# Patient Record
Sex: MALE | Race: WHITE | URBAN - METROPOLITAN AREA
[De-identification: names, ages, dates, MRNs, and addresses within clinical notes are randomized per-mention and may not be internally consistent; named-entity substitution may affect disease eponyms.]

---

## 2017-04-17 ENCOUNTER — TRANSFERRED RECORDS (OUTPATIENT)
Dept: HEALTH INFORMATION MANAGEMENT | Facility: CLINIC | Age: 72
End: 2017-04-17

## 2017-12-18 ENCOUNTER — TRANSFERRED RECORDS (OUTPATIENT)
Dept: HEALTH INFORMATION MANAGEMENT | Facility: CLINIC | Age: 72
End: 2017-12-18

## 2018-01-08 ENCOUNTER — TRANSFERRED RECORDS (OUTPATIENT)
Dept: HEALTH INFORMATION MANAGEMENT | Facility: CLINIC | Age: 73
End: 2018-01-08

## 2018-01-24 ENCOUNTER — TRANSFERRED RECORDS (OUTPATIENT)
Dept: HEALTH INFORMATION MANAGEMENT | Facility: CLINIC | Age: 73
End: 2018-01-24

## 2018-01-27 ENCOUNTER — TRANSFERRED RECORDS (OUTPATIENT)
Dept: HEALTH INFORMATION MANAGEMENT | Facility: CLINIC | Age: 73
End: 2018-01-27

## 2018-02-08 DIAGNOSIS — M54.50 LUMBAGO: Primary | ICD-10-CM

## 2018-02-08 NOTE — TELEPHONE ENCOUNTER
APPT INFO    Date /Time: 2/13/18 9AM   Reason for Appt: Spinal Stenosis/ Lumbar Region   Ref Provider/Clinic: Dr. Thom Zayas   Are there internal records? Yes/No?  IF YES, list clinic names: no   Are there outside records? Yes/No? Yes - see below   Patient Contact (Y/N) & Call Details: No - pt is referred   Action: Faxed request to referring clinic for recs     OUTSIDE RECORDS CHECKLIST     CLINIC NAME COMMENTS REC (x) IMG (x)   [facility]  x x

## 2018-02-08 NOTE — TELEPHONE ENCOUNTER
Records Received [facility]    Date/Exam/Location  (specify location if different)   Office Notes: 1/24/18   Radiology Reports: MRI L Spine 1/8/18  XR L Spine 12/18/17   Missing: Waiting for imaging to be mailed

## 2018-02-09 NOTE — TELEPHONE ENCOUNTER
Received Imaging From: [facility]    Image Type (x): Disc:_x__  Pacs:___      Exam Date/Name: Lumbar Spine 12/18/17  MRI lumbar spine 1/6/18  Left knee 1/24/18  Right knee 1/24/18  Left hip 12/18/17  Knee 8/25/17    Reports included  Comments: put in film room  due to appt date

## 2018-02-12 ASSESSMENT — ENCOUNTER SYMPTOMS
TREMORS: 0
PARALYSIS: 0
DIZZINESS: 0
WEAKNESS: 0
DISTURBANCES IN COORDINATION: 0
NUMBNESS: 1
LOSS OF CONSCIOUSNESS: 0
MEMORY LOSS: 0
HEADACHES: 0
SPEECH CHANGE: 0
TINGLING: 1
SEIZURES: 0

## 2018-02-13 ENCOUNTER — OFFICE VISIT (OUTPATIENT)
Dept: SURGERY | Facility: CLINIC | Age: 73
End: 2018-02-13
Payer: COMMERCIAL

## 2018-02-13 ENCOUNTER — ANESTHESIA EVENT (OUTPATIENT)
Dept: SURGERY | Facility: CLINIC | Age: 73
End: 2018-02-13

## 2018-02-13 ENCOUNTER — OFFICE VISIT (OUTPATIENT)
Dept: ORTHOPEDICS | Facility: CLINIC | Age: 73
End: 2018-02-13
Payer: COMMERCIAL

## 2018-02-13 ENCOUNTER — APPOINTMENT (OUTPATIENT)
Dept: SURGERY | Facility: CLINIC | Age: 73
End: 2018-02-13
Payer: COMMERCIAL

## 2018-02-13 ENCOUNTER — RADIANT APPOINTMENT (OUTPATIENT)
Dept: GENERAL RADIOLOGY | Facility: CLINIC | Age: 73
End: 2018-02-13
Attending: ORTHOPAEDIC SURGERY
Payer: COMMERCIAL

## 2018-02-13 ENCOUNTER — ALLIED HEALTH/NURSE VISIT (OUTPATIENT)
Dept: SURGERY | Facility: CLINIC | Age: 73
End: 2018-02-13
Payer: COMMERCIAL

## 2018-02-13 ENCOUNTER — PRE VISIT (OUTPATIENT)
Dept: ORTHOPEDICS | Facility: CLINIC | Age: 73
End: 2018-02-13

## 2018-02-13 VITALS
TEMPERATURE: 97.8 F | HEART RATE: 67 BPM | HEIGHT: 70 IN | BODY MASS INDEX: 29.92 KG/M2 | OXYGEN SATURATION: 96 % | DIASTOLIC BLOOD PRESSURE: 75 MMHG | WEIGHT: 209 LBS | RESPIRATION RATE: 16 BRPM | SYSTOLIC BLOOD PRESSURE: 145 MMHG

## 2018-02-13 VITALS — WEIGHT: 209 LBS | HEIGHT: 70 IN | BODY MASS INDEX: 29.92 KG/M2

## 2018-02-13 DIAGNOSIS — Z13.228 SCREENING FOR ENDOCRINE, NUTRITIONAL, METABOLIC AND IMMUNITY DISORDER: ICD-10-CM

## 2018-02-13 DIAGNOSIS — Z13.21 SCREENING FOR ENDOCRINE, NUTRITIONAL, METABOLIC AND IMMUNITY DISORDER: ICD-10-CM

## 2018-02-13 DIAGNOSIS — Z13.29 SCREENING FOR ENDOCRINE, NUTRITIONAL, METABOLIC AND IMMUNITY DISORDER: ICD-10-CM

## 2018-02-13 DIAGNOSIS — Z01.818 PREOP EXAMINATION: Primary | ICD-10-CM

## 2018-02-13 DIAGNOSIS — M48.062 SPINAL STENOSIS OF LUMBAR REGION WITH NEUROGENIC CLAUDICATION: Primary | ICD-10-CM

## 2018-02-13 DIAGNOSIS — Z13.0 SCREENING FOR ENDOCRINE, NUTRITIONAL, METABOLIC AND IMMUNITY DISORDER: ICD-10-CM

## 2018-02-13 DIAGNOSIS — Z01.818 PREOP EXAMINATION: ICD-10-CM

## 2018-02-13 DIAGNOSIS — Z01.818 PRE-OPERATIVE EXAMINATION: ICD-10-CM

## 2018-02-13 DIAGNOSIS — M54.50 LUMBAGO: ICD-10-CM

## 2018-02-13 LAB
ALBUMIN SERPL-MCNC: 4 G/DL (ref 3.4–5)
ALP SERPL-CCNC: 78 U/L (ref 40–150)
ALT SERPL W P-5'-P-CCNC: 28 U/L (ref 0–70)
ANION GAP SERPL CALCULATED.3IONS-SCNC: 5 MMOL/L (ref 3–14)
AST SERPL W P-5'-P-CCNC: 13 U/L (ref 0–45)
BILIRUB SERPL-MCNC: 0.5 MG/DL (ref 0.2–1.3)
BUN SERPL-MCNC: 19 MG/DL (ref 7–30)
CALCIUM SERPL-MCNC: 9.1 MG/DL (ref 8.5–10.1)
CHLORIDE SERPL-SCNC: 105 MMOL/L (ref 94–109)
CO2 SERPL-SCNC: 30 MMOL/L (ref 20–32)
CREAT SERPL-MCNC: 1.03 MG/DL (ref 0.66–1.25)
ERYTHROCYTE [DISTWIDTH] IN BLOOD BY AUTOMATED COUNT: 12.8 % (ref 10–15)
GFR SERPL CREATININE-BSD FRML MDRD: 71 ML/MIN/1.7M2
GLUCOSE SERPL-MCNC: 82 MG/DL (ref 70–99)
HBA1C MFR BLD: 6.2 % (ref 4.3–6)
HCT VFR BLD AUTO: 44.4 % (ref 40–53)
HGB BLD-MCNC: 14.7 G/DL (ref 13.3–17.7)
MCH RBC QN AUTO: 29.9 PG (ref 26.5–33)
MCHC RBC AUTO-ENTMCNC: 33.1 G/DL (ref 31.5–36.5)
MCV RBC AUTO: 90 FL (ref 78–100)
PLATELET # BLD AUTO: 176 10E9/L (ref 150–450)
POTASSIUM SERPL-SCNC: 4.5 MMOL/L (ref 3.4–5.3)
PROT SERPL-MCNC: 7.9 G/DL (ref 6.8–8.8)
RBC # BLD AUTO: 4.92 10E12/L (ref 4.4–5.9)
SODIUM SERPL-SCNC: 140 MMOL/L (ref 133–144)
WBC # BLD AUTO: 5.5 10E9/L (ref 4–11)

## 2018-02-13 RX ORDER — AMLODIPINE BESYLATE AND ATORVASTATIN CALCIUM 2.5; 4 MG/1; MG/1
1 TABLET, FILM COATED ORAL DAILY
COMMUNITY
End: 2018-02-13

## 2018-02-13 RX ORDER — CIDER VINEGAR 300 MG
1000 TABLET ORAL 2 TIMES DAILY
COMMUNITY

## 2018-02-13 ASSESSMENT — LIFESTYLE VARIABLES: TOBACCO_USE: 1

## 2018-02-13 ASSESSMENT — ENCOUNTER SYMPTOMS
DYSRHYTHMIAS: 1
ORTHOPNEA: 0

## 2018-02-13 NOTE — PATIENT INSTRUCTIONS
Preparing for Your Surgery      Name:  Edwin Ellison   MRN:  [id number]   :  1945   Today's Date:  2018     Arriving for surgery:  Surgery date:  You will be called 1-2 days prior to your surgery with time, date and location of your surgery. If you do not hear please call Preoperative assessment center at [phone].     Please come to:     [facility] [address]  [address] [place]  [address]    -[employer] parking is available in front of [facility] from 5:15AM to 8:00PM. If you prefer, park your car in the Green Lot.    -Proceed to the 3rd floor, check in at the Adult Surgery Waiting Lounge. [phone]    If an escort is needed stop at the Information Desk in the lobby. Inform the information person that you are here for surgery. An escort to the Adult Surgery Waiting Lounge will be provided.        What can I eat or drink?  -  You may have solid food or milk products until 8 hours prior to your surgery.  -  You may have water, apple juice or 7up/Sprite until 2 hours prior to your surgery.    Which medicines can I take?  -  Do NOT take these medications in the morning, the day of surgery:  Please stop aspirin and fish oil one week prior to surgery.  Please do not take hydrochlorothiazide and lisinopril the morning of surgery.    -  Please take these medications the day of surgery:  Please take atenolol, ranitidine and flovent the morning of surgery.     How do I prepare myself?  -  Take two showers: one the night before surgery; and one the morning of surgery.         Use Scrubcare or Hibiclens to wash from neck down.  You may use your own shampoo and conditioner. No other hair products.   -  Do NOT use lotion, powder, deodorant, or antiperspirant the day of your surgery.  -  Do NOT wear any jewelry.  -  Begin using Incentive Spirometer 1 week prior to surgery.  Use 4 times per day, up to 5 breaths each time.  Bring Incentive Spirometer to  hospital.  -Do not bring your own medications to the hospital, except for inhalers and eye drops.  -  Bring your ID and insurance card.    Questions or Concerns:  If you have questions or concerns, please call the  Preoperative Assessment Center, Monday-Friday 7AM-7PM:  [phone]          AFTER YOUR SURGERY  Breathing exercises   Breathing exercises help you recover faster. Take deep breaths and let the air out slowly. This will:     Help you wake up after surgery.    Help prevent complications like pneumonia.  Preventing complications will help you go home sooner.   We may give you a breathing device (incentive spirometer) to encourage you to breathe deeply.   Nausea and vomiting   You may feel sick to your stomach after surgery; if so, let your nurse know.    Pain control:  After surgery, you may have pain. Our goal is to help you manage your pain. Pain medicine will help you feel comfortable enough to do activities that will help you heal.  These activities may include breathing exercises, walking and physical therapy.   To help your health care team treat your pain we will ask: 1) If you have pain  2) where it is located 3) describe your pain in your words  Methods of pain control include medications given by mouth, vein or by nerve block for some surgeries.  We may give you a pain control pump that will:  1) Deliver the medicine through a tube placed in your vein  2) Control the amount of medicine you receive  3) Allow you to push a button to deliver a dose of pain medicine  Sequential Compression Device (SCD) or Pneumo Boots:  You may need to wear SCD S on your legs or feet. These are wraps connected to a machine that pumps in air and releases it. The repeated pumping helps prevent blood clots from forming.   Using an Incentive Spirometer    An incentive spirometer is a device that helps you do deep breathing exercises. These exercises expand your lungs, aid in circulation, and help prevent pneumonia. Deep  breathing exercises also help you breathe better and improve the function of your lungs by:    Keeping your lungs clear    Strengthening your breathing muscles    Helping prevent respiratory complications or problems  The incentive spirometer gives you a way to take an active part in your care. A nurse or therapist will teach you breathing exercises. To do these exercises, you will breathe in through your mouth and not your nose. The incentive spirometer only works correctly if you breathe in through your mouth.    Steps to clear lungs  Step 1. Exhale normally. Then, inhale normally.    Relax and breathe out.  Step 2. Place your lips tightly around the mouthpiece.    Make sure the device is upright and not tilted.  Step 3. Inhale as much air as you can through the mouthpiece (don't breath through your nose).    Inhale slowly and deeply.    Hold your breath long enough to keep the balls or disk raised for at least 3 to 5 seconds, or as instructed by your healthcare provider.  Step 4. Repeat the exercise regularly.    Begin using the Incentive Spirometer one week prior to your surgery, 4 times per day-5 times each.

## 2018-02-13 NOTE — MR AVS SNAPSHOT
After Visit Summary   2/13/2018    Edwin Ellison    MRN: [id number]           Patient Information     Date Of Birth          1945        Visit Information        Provider Department      2/13/2018 9:00 AM Leo James MD [facility]        Today's Diagnoses     Spinal stenosis of lumbar region with neurogenic claudication    -  1    Screening for endocrine, nutritional, metabolic and immunity disorder        Pre-operative examination           Follow-ups after your visit        Additional Services     PAC Visit Referral (For [facility] Only)       Does this visit require an Anesthesia consult?  L3-5 Decompression    H&P done by:  N/A and Other (Specify): PAC      Please be aware that coverage of these services is subject to the terms and limitations of your health insurance plan.  Call member services at your health plan with any benefit or coverage questions.      Please bring the following to your appointment:  >>   Any x-rays, CTs or MRIs which have been performed.  Contact the facility where they were done to arrange for  prior to your scheduled appointment.  Any new CT, MRI or other procedures ordered by your specialist must be performed at a [facility] facility or coordinated by your clinic's referral office.    >>   List of current medications  >>   This referral request   >>   Any documents/labs given to you for this referral                  Your next 10 appointments already scheduled     Feb 13, 2018  1:10 PM CST   (Arrive by 12:55 PM)   PAC Anesthesia Consult with  Pac Anesthesiologist   [facility] ([facility])    [address] [place] [address]  [address] [facility] [address] [phone]            Feb 13, 2018  1:30 PM CST   LAB with  LAB   [facility] ([facility])    [address] [place] [address]  [address] [facility] [address] [phone]           Please do not eat 10-12  hours before your appointment if you are coming in fasting for labs on lipids, cholesterol, or glucose (sugar). This does not apply to pregnant women. Water, hot tea and black coffee (with nothing added) are okay. Do not drink other fluids, diet soda or chew gum.              Future tests that were ordered for you today     Open Future Orders        Priority Expected Expires Ordered    Vitamin D Deficiency Routine 2/13/2018 2/13/2019 2/13/2018    Albumin level Routine 2/13/2018 2/13/2019 2/13/2018    ABO/Rh type and screen Routine 2/13/2018 3/15/2018 2/13/2018    CBC with platelets Routine 2/13/2018 3/15/2018 2/13/2018    Comprehensive metabolic panel Routine 2/13/2018 3/15/2018 2/13/2018    Hemoglobin A1c Routine  2/14/2019 2/13/2018            Who to contact     Please call your clinic at [phone] to:    Ask questions about your health    Make or cancel appointments    Discuss your medicines    Learn about your test results    Speak to your doctor            Additional Information About Your Visit        [employer] Information     [employer] gives you secure access to your electronic health record. If you see a primary care provider, you can also send messages to your care team and make appointments. If you have questions, please call your primary care clinic.  If you do not have a primary care provider, please call [phone] and they will assist you.      [employer] is an electronic gateway that provides easy, online access to your medical records. With [employer], you can request a clinic appointment, read your test results, renew a prescription or communicate with your care team.     To access your existing account, please contact your [facility] or call [phone] for assistance.        Care EveryWhere ID     This is your Care EveryWhere ID. This could be used by other organizations to access your [facility] medical records  [id number]        Your Vitals Were     Height BMI (Body  "Mass Index)                1.778 m (5' 10\") 29.99 kg/m2           Blood Pressure from Last 3 Encounters:   02/13/18 145/75    Weight from Last 3 Encounters:   02/13/18 94.8 kg (209 lb)   02/13/18 94.8 kg (209 lb)              We Performed the Following     PAC Visit Referral (For [facility] Only)     [facility]-Operative Worksheet          Today's Medication Changes          These changes are accurate as of 2/13/18 12:53 PM.  If you have any questions, ask your nurse or doctor.               Start taking these medicines.        Dose/Directions    ranitidine 150 MG tablet   Commonly known as:  ZANTAC   Started by:  3,  Pac Baltazar        Dose:  150 mg   Take 1 tablet (150 mg) by mouth daily   Quantity:  60 tablet   Refills:  0                Primary Care Provider Fax #    [facility] [phone]       [address] [facility] [address]        Equal Access to Services     PEYTON VILLASENOR : Vladimir Christianson, byron petit, tena adame, celine jorge . So [facility] [phone].    ATENCIÓN: Si habla español, tiene a ma disposición servicios gratuitos de asistencia lingüística. Llame al [phone].    We comply with applicable federal civil rights laws and [place] laws. We do not discriminate on the basis of race, color, national origin, age, disability, sex, sexual orientation, or gender identity.            Thank you!     Thank you for choosing [facility]  for your care. Our goal is always to provide you with excellent care. Hearing back from our patients is one way we can continue to improve our services. Please take a few minutes to complete the written survey that you may receive in the mail after your visit with us. Thank you!             Your Updated Medication List - Protect others around you: Learn how to safely use, store and throw away your medicines at www.disposemymeds.org.          This list is accurate as of 2/13/18 12:53 PM.  " Always use your most recent med list.                   Brand Name Dispense Instructions for use Diagnosis    ASPIRIN PO      Take 325 mg by mouth At Bedtime        ATENOLOL PO      Take 12.5 mg by mouth every morning        ATORVASTATIN CALCIUM PO      Take 40 mg by mouth At Bedtime        Fish Oil 1000 MG Cpdr      Take 1,000 mg by mouth 2 times daily        FLUTICASONE FUROATE NA      [place] in nostril 2 times daily        HYDROCHLOROTHIAZIDE PO      Take 25 mg by mouth every morning        LISINOPRIL PO      Take 20 mg by mouth every morning        ranitidine 150 MG tablet    ZANTAC    60 tablet    Take 1 tablet (150 mg) by mouth daily

## 2018-02-13 NOTE — LETTER
2/13/2018       RE: Edwin Ellison  [address]  [place] [facility] [address]     Dear Colleague,    Thank you for referring your patient, Edwin Ellison, to the [facility] at [facility]. Please see a copy of my visit note below.    Spine Surgery Return Clinic Visit      Chief Complaint:   Back Pain (pt states he is having numbness and tingling in his right leg, some days his back pain comes and goes and the right ankle has shooting pain and then can't walk feels like rocks on the bottom of the foot.)      Interval HPI:  Symptom Profile Including: location of symptoms, onset, severity, exacerbating/alleviating factors, previous treatments:        Edwin Ellison is a 72 year old male who presents for evaluation of lumbar stenosis with neurogenic claudication and right greater than left claudicatory leg pain.  The pain starts in his right buttock and radiates down the lateral aspect of the leg into the foot in an L5 distribution.  He has noticed progressive numbness over the last several months.  He has had back and leg problems for years but it was not until a few months ago that he developed this numbness problem.  He is also noticed that the right foot and right great toe are getting weaker than they were in the past.  He is done multiple rounds of lumbar physical therapy with core strengthening exercises.  He is also been taking Tylenol for pain relief.  He has wisely avoided narcotics.  He has had multiple steroid injections for his knees and none of them were very effective so he has not wanted to pursue lumbar epidural steroid injections.            Past Medical History:     Past Medical History:   Diagnosis Date     Arthritis      Cardiac abnormality      Hearing problem      Heart disease      Hyperlipidemia      Hypertension      Reduced vision             Past Surgical History:     Past Surgical History:   Procedure Laterality Date     C CARDIAC SURG PROCEDURE UNLIST    "    C HAND/FINGER SURGERY UNLISTED       C PELVIS/HIP JOINT SURGERY UNLISTED       KNEE SURGERY              Social History:     Social History   Substance Use Topics     Smoking status: Former Smoker     Packs/day: 1.50     Years: 20.00     Types: Cigarettes, Cigars, Pipe, Dip, chew, snus or snuff     Start date: 1/1/1965     Quit date: 1/1/1985     Smokeless tobacco: Former User     Quit date: 1/1/1985     Alcohol use Yes            Family History:   No family history on file.         Allergies:   Not on File         Medications:     No current outpatient prescriptions on file.     No current facility-administered medications for this visit.              Review of Systems:   A 10 point musculoskeletal and neurologic ROS was performed with pertinent positives and negatives noted in the HPI.  Additional systems were also reviewed and are documented at the bottom of the note.         Physical Exam:   Vitals: Ht 1.778 m (5' 10\")  Wt 94.8 kg (209 lb)  BMI 29.99 kg/m2  Constitutional: awake, alert, cooperative, no apparent distress, appears stated age.    Eyes: The sclera are white.  Ears, Nose, Throat: The trachea is midline.  Psychiatric: The patient has a normal affect.  Respiratory: breathing non-labored  Cardiovascular: The extremities are warm and perfused.  Skin: no obvious rashes or lesions.  Musculoskeletal, Neurologic, and Spine:   The bilateral lower extremities are examined.  He has 5 out of 5 strength in hip flexion, knee extension, knee flexion, tibialis anterior.  2 out of 5 right extensor hallux longus on the right side and 4 out of 5 on the left side.  Positive right straight leg raise.  He has crepitus in the right knee with range of motion.  Diminished left patellar reflex secondary to his previous knee replacement.  2+ on the right.  +1 Achilles reflexes bilaterally.  No clonus.  I cannot elicit a Babinski sign.  He has diminished sensation right L5 distribution.  Otherwise intact L3-S1.         " Imaging:   We ordered and independently reviewed new radiographs at this clinic visit. The results were discussed with the patient. Findings include:     January 6, 2018 lumbar MRI: Severe lateral recess stenosis at L3-4.  Severe central and lateral recess stenosis L4-5 with an annular tear.  Moderate left greater than right foraminal stenosis at L4-5.  Moderate spondylosis at L5-S1 without obvious neurologic compression.  Diffuse spondylosis at other levels without obvious neurologic compression.    December 18, 2017 lumbar radiographs: Diffuse lumbar spondylosis with no obvious instability or fractures.  Preserved overall alignment.    New flexion-extension lumbar radiographs were ordered and reviewed today.       Assessment and Plan:     72 year old male with severe lumbar spondylosis and neurogenic claudication.    At this point options would be to continue conservative care with injections, physical therapy and oral medications.    Alternatively, if he wished to proceed with surgery I would recommend an L3-5 decompression.  He does have some foraminal stenosis at the L4-5 level but I do not think it is too severe and I think we could probably address this by undercutting the facet joints.  He has minimal back pain and no instability so I think we could try to avoid doing a fusion surgery at this time.    Risks of this surgery include risk of infection, risk of dural tear resulting in CSF leak which might result in headaches, or possible need for lumbar drain, or possible revision surgery in the setting of a persistent leak. Risk of seroma or hematoma requiring revision surgery. Possible nerve root injury resulting in numbness weakness or paralysis into the leg. Possible radiculitis which could result in similar symptoms or could result in significant neurogenic type pain into the leg. Risk of incomplete decompression which might require revision surgery in the future.  Risk of pars fracture or postoperative  instability requiring conversion to a fusion in the future. Risk of adjacent segment problems requiring surgery in the future. Risk of incomplete relief of symptoms possibly requiring revision surgery in the future. There is a risk of blood clots in the legs or the lungs.  Furthermore, although rare, there are risks of major vessel or major organ injury from the surgery, and risks of the anesthetic including stroke heart attack and death.    He would like to proceed.  I will have him see the anesthesia clinic for medical clearance.  We will get him scheduled at his convenience.    Respectfully,  Leo James MD  Spine Surgery  [facility]

## 2018-02-13 NOTE — MR AVS SNAPSHOT
After Visit Summary   2018    Edwin Ellison    MRN: [id number]           Patient Information     Date Of Birth          1945        Visit Information        Provider Department      2018 12:30 PM Rn, [facility]        Care Instructions    Preparing for Your Surgery      Name:  Edwin Ellison   MRN:  [id number]   :  1945   Today's Date:  2018     Arriving for surgery:  Surgery date:  You will be called 1-2 days prior to your surgery with time, date and location of your surgery. If you do not hear please call Preoperative assessment center at [phone].     Please come to:     [facility] [address]  [address] [place]  [address]    - parking is available in front of [facility] from 5:15AM to 8:00PM. If you prefer, park your car in the Green Lot.    -Proceed to the 3rd floor, check in at the Adult Surgery Waiting Lounge. [phone]    If an escort is needed stop at the Information Desk in the lobby. Inform the information person that you are here for surgery. An escort to the Adult Surgery Waiting Lounge will be provided.        What can I eat or drink?  -  You may have solid food or milk products until 8 hours prior to your surgery.  -  You may have water, apple juice or 7up/Sprite until 2 hours prior to your surgery.    Which medicines can I take?  -  Do NOT take these medications in the morning, the day of surgery:  Please stop aspirin and fish oil one week prior to surgery.  Please do not take hydrochlorothiazide and lisinopril the morning of surgery.    -  Please take these medications the day of surgery:  Please take atenolol, ranitidine and flovent the morning of surgery.     How do I prepare myself?  -  Take two showers: one the night before surgery; and one the morning of surgery.         Use Scrubcare or Hibiclens to wash from neck down.  You may use your own shampoo and  conditioner. No other hair products.   -  Do NOT use lotion, powder, deodorant, or antiperspirant the day of your surgery.  -  Do NOT wear any jewelry.  -  Begin using Incentive Spirometer 1 week prior to surgery.  Use 4 times per day, up to 5 breaths each time.  Bring Incentive Spirometer to hospital.  -Do not bring your own medications to the hospital, except for inhalers and eye drops.  -  Bring your ID and insurance card.    Questions or Concerns:  If you have questions or concerns, please call the  Preoperative Assessment Center, Monday-Friday 7AM-7PM:  [phone]          AFTER YOUR SURGERY  Breathing exercises   Breathing exercises help you recover faster. Take deep breaths and let the air out slowly. This will:     Help you wake up after surgery.    Help prevent complications like pneumonia.  Preventing complications will help you go home sooner.   We may give you a breathing device (incentive spirometer) to encourage you to breathe deeply.   Nausea and vomiting   You may feel sick to your stomach after surgery; if so, let your nurse know.    Pain control:  After surgery, you may have pain. Our goal is to help you manage your pain. Pain medicine will help you feel comfortable enough to do activities that will help you heal.  These activities may include breathing exercises, walking and physical therapy.   To help your health care team treat your pain we will ask: 1) If you have pain  2) where it is located 3) describe your pain in your words  Methods of pain control include medications given by mouth, vein or by nerve block for some surgeries.  We may give you a pain control pump that will:  1) Deliver the medicine through a tube placed in your vein  2) Control the amount of medicine you receive  3) Allow you to push a button to deliver a dose of pain medicine  Sequential Compression Device (SCD) or Pneumo Boots:  You may need to wear SCD S on your legs or feet. These are wraps connected to a machine that  pumps in air and releases it. The repeated pumping helps prevent blood clots from forming.   Using an Incentive Spirometer    An incentive spirometer is a device that helps you do deep breathing exercises. These exercises expand your lungs, aid in circulation, and help prevent pneumonia. Deep breathing exercises also help you breathe better and improve the function of your lungs by:    Keeping your lungs clear    Strengthening your breathing muscles    Helping prevent respiratory complications or problems  The incentive spirometer gives you a way to take an active part in your care. A nurse or therapist will teach you breathing exercises. To do these exercises, you will breathe in through your mouth and not your nose. The incentive spirometer only works correctly if you breathe in through your mouth.    Steps to clear lungs  Step 1. Exhale normally. Then, inhale normally.    Relax and breathe out.  Step 2. Place your lips tightly around the mouthpiece.    Make sure the device is upright and not tilted.  Step 3. Inhale as much air as you can through the mouthpiece (don't breath through your nose).    Inhale slowly and deeply.    Hold your breath long enough to keep the balls or disk raised for at least 3 to 5 seconds, or as instructed by your healthcare provider.  Step 4. Repeat the exercise regularly.    Begin using the Incentive Spirometer one week prior to your surgery, 4 times per day-5 times each.          Follow-ups after your visit        Your next 10 appointments already scheduled     Feb 13, 2018  1:10 PM CST   (Arrive by 12:55 PM)   PAC Anesthesia Consult with  Pac Anesthesiologist   [facility] Pre[facility] ([facility])    [address] [place] [address]  [facility] [address] [phone]            Feb 13, 2018  1:30 PM CST   LAB with  LAB   [facility] Lab ([facility])    [address] [place] [address]  [facility] [facility] [id number]    [phone]           Please do not eat 10-12 hours before your appointment if you are coming in fasting for labs on lipids, cholesterol, or glucose (sugar). This does not apply to pregnant women. Water, hot tea and black coffee (with nothing added) are okay. Do not drink other fluids, diet soda or chew gum.              Future tests that were ordered for you today     Open Future Orders        Priority Expected Expires Ordered    Vitamin D Deficiency Routine 2/13/2018 2/13/2019 2/13/2018    Albumin level Routine 2/13/2018 2/13/2019 2/13/2018    ABO/Rh type and screen Routine 2/13/2018 3/15/2018 2/13/2018    CBC with platelets Routine 2/13/2018 3/15/2018 2/13/2018    Comprehensive metabolic panel Routine 2/13/2018 3/15/2018 2/13/2018    Hemoglobin A1c Routine  2/14/2019 2/13/2018            Who to contact     Please call your clinic at [phone] to:    Ask questions about your health    Make or cancel appointments    Discuss your medicines    Learn about your test results    Speak to your doctor            Additional Information About Your Visit        [employer] Information     [employer] gives you secure access to your electronic health record. If you see a primary care provider, you can also send messages to your care team and make appointments. If you have questions, please call your primary care clinic.  If you do not have a primary care provider, please call [phone] and they will assist you.      [employer] is an electronic gateway that provides easy, online access to your medical records. With [employer], you can request a clinic appointment, read your test results, renew a prescription or communicate with your care team.     To access your existing account, please contact your [facility] or call [phone] for assistance.        Care EveryWhere ID     This is your Care EveryWhere ID. This could be used by other organizations to access your [facility]  records  [id number]         Blood Pressure from Last 3 Encounters:   02/13/18 145/75    Weight from Last 3 Encounters:   02/13/18 94.8 kg (209 lb)   02/13/18 94.8 kg (209 lb)              Today, you had the following     No orders found for display         Today's Medication Changes          These changes are accurate as of 2/13/18  1:02 PM.  If you have any questions, ask your nurse or doctor.               Start taking these medicines.        Dose/Directions    ranitidine 150 MG tablet   Commonly known as:  ZANTAC   Started by:  3,  Pac Baltazar        Dose:  150 mg   Take 1 tablet (150 mg) by mouth daily   Quantity:  60 tablet   Refills:  0                Primary Care Provider Fax #    [facility] [phone]       [address] [facility] [address]        Equal Access to Services     PEYTON VILLASENOR : Vladimir Christianson, byron petit, tena adame, celine medrano. So [facility] [phone].    ATENCIÓN: Si habla español, tiene a ma disposición servicios gratuitos de asistencia lingüística. Llame al [phone].    We comply with applicable federal civil rights laws and [place] laws. We do not discriminate on the basis of race, color, national origin, age, disability, sex, sexual orientation, or gender identity.            Thank you!     Thank you for choosing [facility]  for your care. Our goal is always to provide you with excellent care. Hearing back from our patients is one way we can continue to improve our services. Please take a few minutes to complete the written survey that you may receive in the mail after your visit with us. Thank you!             Your Updated Medication List - Protect others around you: Learn how to safely use, store and throw away your medicines at www.disposemymeds.org.          This list is accurate as of 2/13/18  1:02 PM.  Always use your most recent med list.                    Brand Name Dispense Instructions for use Diagnosis    ASPIRIN PO      Take 325 mg by mouth At Bedtime        ATENOLOL PO      Take 12.5 mg by mouth every morning        ATORVASTATIN CALCIUM PO      Take 40 mg by mouth At Bedtime        Fish Oil 1000 MG Cpdr      Take 1,000 mg by mouth 2 times daily        FLUTICASONE FUROATE NA      [place] in nostril 2 times daily        HYDROCHLOROTHIAZIDE PO      Take 25 mg by mouth every morning        LISINOPRIL PO      Take 20 mg by mouth every morning        ranitidine 150 MG tablet    ZANTAC    60 tablet    Take 1 tablet (150 mg) by mouth daily

## 2018-02-13 NOTE — NURSING NOTE
Teaching Flowsheet   Relevant Diagnosis: lumbar stenosis  Teaching Topic: pre-op teaching     Person(s) involved in teaching:   Patient     Motivation Level:  Asks Questions: Yes  Eager to Learn: Yes  Cooperative: Yes  Receptive (willing/able to accept information): Yes  Any cultural factors/Roman Catholic beliefs that may influence understanding or compliance? No       Patient demonstrates understanding of the following:  Reason for the appointment, diagnosis and treatment plan: Yes  Knowledge of proper use of medications and conditions for which they are ordered (with special attention to potential side effects or drug interactions): Yes  Which situations necessitate calling provider and whom to contact: Yes     Teaching Concerns Addressed:   Comments: Pt to see PAC clinic today for H&P, MRSA nasal swab done and sent to lab, pt will have pre-op spine blood work (albumin, hgba1c, vit D) drawn with PAC labs on way out today and is aware.      Proper use and care of medication (medical equip, care aids, etc.): Yes  Nutritional needs and diet plan: Yes  Pain management techniques: Yes  Wound Care: Yes  How and/when to access community resources: Yes     Instructional Materials Used/Given: pre-op teach packet     Time spent with patient: 15 minutes.

## 2018-02-13 NOTE — NURSING NOTE
"Reason For Visit:   Chief Complaint   Patient presents with     Back Pain     pt states he is having numbness and tingling in his right leg, some days his back pain comes and goes and the right ankle has shooting pain and then can't walk feels like rocks on the bottom of the foot.       Primary MD: [facility]  Ref. MD:     ?  No  Occupation :None.  Currently working? No.  Work status?  Retired.  Smoker: No  Request smoking cessation information: No    Ht 1.778 m (5' 10\")  Wt 94.8 kg (209 lb)  BMI 29.99 kg/m2    Pain Assessment  Patient Currently in Pain: Yes  Primary Pain Location: Leg  Pain Orientation: Right  Pain Descriptors: Numbness, Tingling    Oswestry (CHAS) Questionnaire    OSWESTRY DISABILITY INDEX 2/12/2018   Count 9   Sum 14   Oswestry Score (%) 31.11          Visual Analog Pain Scale  Back Pain Scale 0-10: 0  Right leg pain: 6 (numbness and tingling)    Promis 10 Assessment    PROMIS 10 2/12/2018   In general, would you say your health is: Good   In general, would you say your quality of life is: Good   In general, how would you rate your physical health? Fair   In general, how would you rate your mental health, including your mood and your ability to think? Good   In general, how would you rate your satisfaction with your social activities and relationships? Good   In general, please rate how well you carry out your usual social activities and roles Good   To what extent are you able to carry out your everyday physical activities such as walking, climbing stairs, carrying groceries, or moving a chair? Moderately   How often have you been bothered by emotional problems such as feeling anxious, depressed or irritable? Rarely   How would you rate your fatigue on average? None   How would you rate your pain on average?   0 = No Pain  to  10 = Worst Imaginable Pain 2   In general, would you say your health is: 3   In general, would you say your quality of life is: 3 "   In general, how would you rate your physical health? 2   In general, how would you rate your mental health, including your mood and your ability to think? 3   In general, how would you rate your satisfaction with your social activities and relationships? 3   In general, please rate how well you carry out your usual social activities and roles. (This includes activities at home, at work and in your community, and responsibilities as a parent, child, spouse, employee, friend, etc.) 3   To what extent are you able to carry out your everyday physical activities such as walking, climbing stairs, carrying groceries, or moving a chair? 3   In the past 7 days, how often have you been bothered by emotional problems such as feeling anxious, depressed, or irritable? 2   In the past 7 days, how would you rate your fatigue on average? 1   In the past 7 days, how would you rate your pain on average, where 0 means no pain, and 10 means worst imaginable pain? 2   Global Mental Health Score 13   Global Physical Health Score 14   PROMIS TOTAL - SUBSCORES 27   Some recent data might be hidden                Teo Mederos CMA

## 2018-02-13 NOTE — ANESTHESIA PREPROCEDURE EVALUATION
Anesthesia Evaluation     . Pt has had prior anesthetic. Type: General    No history of anesthetic complications          ROS/MED HX    ENT/Pulmonary:     (+)SHERON risk factors snores loudly, hypertension, tobacco use, Past use , . .   (-) recent URI   Neurologic:     (+)neuropathy - Numbness in the right foot.  ,     Cardiovascular:     (+) Dyslipidemia, hypertension-range: 120/70s at home, -CAD, --stent,. Taking blood thinners Pt has received instructions: Instructions Given to patient: Patient instructed to hold ASA in preparation for surgery.  . . . :. dysrhythmias PVCs, Irregular Heartbeat/Palpitations, .      (-) angina, past MI, JIN, orthopnea/PND, angina, past MI and CABG   METS/Exercise Tolerance: Comment: Activity is limited by lumbar stenosis with neurogenic claudication in the right foot.   3 - Able to walk 1-2 blocks without stopping   Hematologic:     (+) History of Transfusion no previous transfusion reaction -      Musculoskeletal:   (+) , , other musculoskeletal- Chronic back pain.        GI/Hepatic:     (+) GERD Asymptomatic on medication,       Renal/Genitourinary:  - ROS Renal section negative       Endo:  - neg endo ROS       Psychiatric:  - neg psychiatric ROS       Infectious Disease:  - neg infectious disease ROS       Malignancy:      - no malignancy   Other:    (+) H/O Chronic Pain,                   Physical Exam      Airway   Mallampati: I  TM distance: >3 FB  Neck ROM: full    Dental   (+) caps and missing    Cardiovascular   Rhythm and rate: regular and normal  (+) murmur   (-) carotid bruit is not present and no peripheral edema    Pulmonary    breath sounds clear to auscultation    Other findings: 2/13/18: WBC:  5.5; Hgb: 14.7; Hct: 44.4; Plt: 176  2/13/18: Na: 140; K: 4.5; Cl: 105; Glu: 82; BUN: 19; Cr: 1.03; Ca: 9.1; Bili total: 0.5; Albumin: 4; Alk phos: 78; ALT: 28; AST: 13; HgbA1c 6.2    2/13/18 EKG: Sinus bradycardia with right bundle branch block, no acute changes noted.            PAC Discussion and Assessment    ASA Classification: 2  Case is suitable for: [facility]  Anesthetic techniques and relevant risks discussed: GA  Invasive monitoring and risk discussed: No  Types:   Possibility and Risk of blood transfusion discussed: Yes  NPO instructions given:   Additional anesthetic preparation and risks discussed:   Needs early admission to pre-op area:   Other:     PAC Resident/NP Anesthesia Assessment:  Edwin Ellison is a 72 year old male scheduled to undergo Lumbar 3-5 Decompression on an undetermined date with Dr. Leo James.    He has the following specific operative considerations:   1.  Increased risk of obstructive sleep apnea: Recommend careful positioning to prevent airway compromise and close monitoring of the patient's respiratory status.    2.  GERD: Patient instructed to take Tagamet as prescribed.  Consider use of RSI techniques with advanced airway maneuvers.  3.  HTN: Patient instructed to continue Atenolol as prescribed, but hold Lisinopril and HCTZ the AM of surgery.  4.  Chronic pain: Recommend careful positioning throughout the perioperative period to prevent injury or exacerbation of pain.    5.  Type & screen, CBC, BMP, EKG today.    Revised Cardiac Risk Index: 0.9% risk of major adverse cardiac event.  VTE risk: 1.8%  SHERON risk: Intermediate.  PONV risk score= 2.  (If > 2, anti-emetic intervention is recommended.)  Anesthesia considerations: Refer to PAC assessment in the anesthesia records.    Reviewed and Signed by PAC Mid-Level Provider/Resident  Mid-Level Provider/Resident: Eugenie Lawrence CNP  Date: 2/13/18  Time:     Attending Anesthesiologist Anesthesia Assessment:  72 year old for L3-5 decompression, fusion, etc in management of back pain. Patient has known CAD with hx of PCI X1 2010. Has done well since then. No significant pulmonary disease. Stable HTN, HLD, GERD.   Patient/case discussed with NICHO. No need to see patient. Patient is appropriate for the  planned procedure without further work-up or medical management.      Reviewed and Signed by PAC Anesthesiologist  Anesthesiologist: [email or web address]  Date: 2/13/2018  Time:   Pass/Fail: Pass  Disposition:     PAC Pharmacist Assessment:        Pharmacist:   Date:   Time:                           .
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
## 2018-02-13 NOTE — PROGRESS NOTES
Spine Surgery Return Clinic Visit      Chief Complaint:   Back Pain (pt states he is having numbness and tingling in his right leg, some days his back pain comes and goes and the right ankle has shooting pain and then can't walk feels like rocks on the bottom of the foot.)      Interval HPI:  Symptom Profile Including: location of symptoms, onset, severity, exacerbating/alleviating factors, previous treatments:        Edwin Ellison is a 72 year old male who presents for evaluation of lumbar stenosis with neurogenic claudication and right greater than left claudicatory leg pain.  The pain starts in his right buttock and radiates down the lateral aspect of the leg into the foot in an L5 distribution.  He has noticed progressive numbness over the last several months.  He has had back and leg problems for years but it was not until a few months ago that he developed this numbness problem.  He is also noticed that the right foot and right great toe are getting weaker than they were in the past.  He is done multiple rounds of lumbar physical therapy with core strengthening exercises.  He is also been taking Tylenol for pain relief.  He has wisely avoided narcotics.  He has had multiple steroid injections for his knees and none of them were very effective so he has not wanted to pursue lumbar epidural steroid injections.            Past Medical History:     Past Medical History:   Diagnosis Date     Arthritis      Cardiac abnormality      Hearing problem      Heart disease      Hyperlipidemia      Hypertension      Reduced vision             Past Surgical History:     Past Surgical History:   Procedure Laterality Date     C CARDIAC SURG PROCEDURE UNLIST       C HAND/FINGER SURGERY UNLISTED       C PELVIS/HIP JOINT SURGERY UNLISTED       KNEE SURGERY              Social History:     Social History   Substance Use Topics     Smoking status: Former Smoker     Packs/day: 1.50     Years: 20.00     Types: Cigarettes, Cigars, Pipe,  "Dip, chew, snus or snuff     Start date: 1/1/1965     Quit date: 1/1/1985     Smokeless tobacco: Former User     Quit date: 1/1/1985     Alcohol use Yes            Family History:   No family history on file.         Allergies:   Not on File         Medications:     No current outpatient prescriptions on file.     No current facility-administered medications for this visit.              Review of Systems:   A 10 point musculoskeletal and neurologic ROS was performed with pertinent positives and negatives noted in the HPI.  Additional systems were also reviewed and are documented at the bottom of the note.         Physical Exam:   Vitals: Ht 1.778 m (5' 10\")  Wt 94.8 kg (209 lb)  BMI 29.99 kg/m2  Constitutional: awake, alert, cooperative, no apparent distress, appears stated age.    Eyes: The sclera are white.  Ears, Nose, Throat: The trachea is midline.  Psychiatric: The patient has a normal affect.  Respiratory: breathing non-labored  Cardiovascular: The extremities are warm and perfused.  Skin: no obvious rashes or lesions.  Musculoskeletal, Neurologic, and Spine:   The bilateral lower extremities are examined.  He has 5 out of 5 strength in hip flexion, knee extension, knee flexion, tibialis anterior.  2 out of 5 right extensor hallux longus on the right side and 4 out of 5 on the left side.  Positive right straight leg raise.  He has crepitus in the right knee with range of motion.  Diminished left patellar reflex secondary to his previous knee replacement.  2+ on the right.  +1 Achilles reflexes bilaterally.  No clonus.  I cannot elicit a Babinski sign.  He has diminished sensation right L5 distribution.  Otherwise intact L3-S1.         Imaging:   We ordered and independently reviewed new radiographs at this clinic visit. The results were discussed with the patient. Findings include:     January 6, 2018 lumbar MRI: Severe lateral recess stenosis at L3-4.  Severe central and lateral recess stenosis L4-5 with " an annular tear.  Moderate left greater than right foraminal stenosis at L4-5.  Moderate spondylosis at L5-S1 without obvious neurologic compression.  Diffuse spondylosis at other levels without obvious neurologic compression.    December 18, 2017 lumbar radiographs: Diffuse lumbar spondylosis with no obvious instability or fractures.  Preserved overall alignment.    New flexion-extension lumbar radiographs were ordered and reviewed today.       Assessment and Plan:     72 year old male with severe lumbar spondylosis and neurogenic claudication.    At this point options would be to continue conservative care with injections, physical therapy and oral medications.    Alternatively, if he wished to proceed with surgery I would recommend an L3-5 decompression.  He does have some foraminal stenosis at the L4-5 level but I do not think it is too severe and I think we could probably address this by undercutting the facet joints.  He has minimal back pain and no instability so I think we could try to avoid doing a fusion surgery at this time.    Risks of this surgery include risk of infection, risk of dural tear resulting in CSF leak which might result in headaches, or possible need for lumbar drain, or possible revision surgery in the setting of a persistent leak. Risk of seroma or hematoma requiring revision surgery. Possible nerve root injury resulting in numbness weakness or paralysis into the leg. Possible radiculitis which could result in similar symptoms or could result in significant neurogenic type pain into the leg. Risk of incomplete decompression which might require revision surgery in the future.  Risk of pars fracture or postoperative instability requiring conversion to a fusion in the future. Risk of adjacent segment problems requiring surgery in the future. Risk of incomplete relief of symptoms possibly requiring revision surgery in the future. There is a risk of blood clots in the legs or the lungs.   Furthermore, although rare, there are risks of major vessel or major organ injury from the surgery, and risks of the anesthetic including stroke heart attack and death.    He would like to proceed.  I will have him see the anesthesia clinic for medical clearance.  We will get him scheduled at his convenience.    Respectfully,  Leo James MD  Spine Surgery  [facility]    Answers for HPI/ROS submitted by the patient on 2/12/2018   General Symptoms: No  Skin Symptoms: No  HENT Symptoms: No  EYE SYMPTOMS: No  HEART SYMPTOMS: No  LUNG SYMPTOMS: No  INTESTINAL SYMPTOMS: No  URINARY SYMPTOMS: No  REPRODUCTIVE SYMPTOMS: No  SKELETAL SYMPTOMS: Yes  BLOOD SYMPTOMS: No  NERVOUS SYSTEM SYMPTOMS: Yes  MENTAL HEALTH SYMPTOMS: No  Trouble with coordination: No  Dizziness or trouble with balance: No  Fainting or black-out spells: No  Memory loss: No  Headache: No  Seizures: No  Speech problems: No  Tingling: Yes  Tremor: No  Weakness: No  Difficulty walking: Yes  Paralysis: No  Numbness: Yes

## 2018-02-14 ENCOUNTER — TELEPHONE (OUTPATIENT)
Dept: ORTHOPEDICS | Facility: CLINIC | Age: 73
End: 2018-02-14

## 2018-02-14 ENCOUNTER — TRANSFERRED RECORDS (OUTPATIENT)
Dept: HEALTH INFORMATION MANAGEMENT | Facility: CLINIC | Age: 73
End: 2018-02-14

## 2018-02-14 LAB — DEPRECATED CALCIDIOL+CALCIFEROL SERPL-MC: 23 UG/L (ref 20–75)

## 2018-02-14 NOTE — TELEPHONE ENCOUNTER
Called Edwin to schedule surgery with Dr. James. Edwin has been scheduled for an L3-5 decompression for 3/7/18 at [place]. He knows he will receive a call 1-3 days before surgery to cover prep instructions and confirm his arrival time. He saw PAC for his H&P on 2/13/18.

## 2018-02-15 LAB
BACTERIA SPEC CULT: NORMAL
Lab: NORMAL
SPECIMEN SOURCE: NORMAL

## 2018-03-05 ENCOUNTER — TELEPHONE (OUTPATIENT)
Dept: ORTHOPEDICS | Facility: CLINIC | Age: 73
End: 2018-03-05

## 2018-03-05 NOTE — TELEPHONE ENCOUNTER
Called patient to let him know that I provided Carolina with the incorrect check in time for surgery. He should arrive for his procedure with Dr. James by 12:00 noon on Wednesday instead of 9:15am. I left him my direct number to call should he have further questions.

## 2018-03-05 NOTE — TELEPHONE ENCOUNTER
Pt. Has spine surgery scheduled for Wed 3-7-18.  Pt. Called today stating Sat 3-3-18 he started having new symptoms of head cold  Sore throat, congested, nonproductive cough, headaches, & temps. Today 100.3 & 98.4.  states wife was sick first.  Reviewed with  who ordered continue with surgery plan but might have to cancel that day if elevated fever.  Push fluids & rest.  Pt. Understands could be canceled day of surgery prn.  T.O.R.B./Carolina Frias RN.

## 2018-03-07 ENCOUNTER — ANESTHESIA (OUTPATIENT)
Dept: SURGERY | Facility: CLINIC | Age: 73
DRG: 517 | End: 2018-03-07
Payer: COMMERCIAL

## 2018-03-07 ENCOUNTER — HOSPITAL ENCOUNTER (INPATIENT)
Facility: CLINIC | Age: 73
LOS: 1 days | Discharge: HOME | DRG: 517 | End: 2018-03-09
Attending: ORTHOPAEDIC SURGERY | Admitting: ORTHOPAEDIC SURGERY
Payer: COMMERCIAL

## 2018-03-07 ENCOUNTER — APPOINTMENT (OUTPATIENT)
Dept: GENERAL RADIOLOGY | Facility: CLINIC | Age: 73
DRG: 517 | End: 2018-03-07
Attending: ORTHOPAEDIC SURGERY
Payer: COMMERCIAL

## 2018-03-07 ENCOUNTER — ANESTHESIA EVENT (OUTPATIENT)
Dept: SURGERY | Facility: CLINIC | Age: 73
DRG: 517 | End: 2018-03-07
Payer: COMMERCIAL

## 2018-03-07 ENCOUNTER — TELEPHONE (OUTPATIENT)
Dept: ORTHOPEDICS | Facility: CLINIC | Age: 73
End: 2018-03-07

## 2018-03-07 DIAGNOSIS — Z98.890 STATUS POST LUMBAR SPINE SURGERY FOR DECOMPRESSION OF SPINAL CORD: Primary | ICD-10-CM

## 2018-03-07 LAB
ABO + RH BLD: NORMAL
ABO + RH BLD: NORMAL
BLD GP AB SCN SERPL QL: NORMAL
BLOOD BANK CMNT PATIENT-IMP: NORMAL
BLOOD BANK CMNT PATIENT-IMP: NORMAL
CREAT SERPL-MCNC: 1.08 MG/DL (ref 0.66–1.25)
GFR SERPL CREATININE-BSD FRML MDRD: 67 ML/MIN/1.7M2
GLUCOSE BLDC GLUCOMTR-MCNC: 107 MG/DL (ref 70–99)
POTASSIUM SERPL-SCNC: 5.3 MMOL/L (ref 3.4–5.3)
SPECIMEN EXP DATE BLD: NORMAL

## 2018-03-07 PROCEDURE — 71000014 ZZH RECOVERY PHASE 1 LEVEL 2 FIRST HR: Performed by: ORTHOPAEDIC SURGERY

## 2018-03-07 PROCEDURE — 25000128 H RX IP 250 OP 636: Performed by: ORTHOPAEDIC SURGERY

## 2018-03-07 PROCEDURE — 25000125 ZZHC RX 250: Performed by: NURSE ANESTHETIST, CERTIFIED REGISTERED

## 2018-03-07 PROCEDURE — P9041 ALBUMIN (HUMAN),5%, 50ML: HCPCS | Performed by: NURSE ANESTHETIST, CERTIFIED REGISTERED

## 2018-03-07 PROCEDURE — 82565 ASSAY OF CREATININE: CPT | Performed by: ANESTHESIOLOGY

## 2018-03-07 PROCEDURE — 01NB0ZZ RELEASE LUMBAR NERVE, OPEN APPROACH: ICD-10-PCS | Performed by: ORTHOPAEDIC SURGERY

## 2018-03-07 PROCEDURE — C9399 UNCLASSIFIED DRUGS OR BIOLOG: HCPCS | Performed by: NURSE ANESTHETIST, CERTIFIED REGISTERED

## 2018-03-07 PROCEDURE — 72020 X-RAY EXAM OF SPINE 1 VIEW: CPT

## 2018-03-07 PROCEDURE — 25000128 H RX IP 250 OP 636: Performed by: ANESTHESIOLOGY

## 2018-03-07 PROCEDURE — 36000064 ZZH SURGERY LEVEL 4 EA 15 ADDTL MIN - UMMC: Performed by: ORTHOPAEDIC SURGERY

## 2018-03-07 PROCEDURE — 25000132 ZZH RX MED GY IP 250 OP 250 PS 637: Performed by: STUDENT IN AN ORGANIZED HEALTH CARE EDUCATION/TRAINING PROGRAM

## 2018-03-07 PROCEDURE — 84132 ASSAY OF SERUM POTASSIUM: CPT | Performed by: ANESTHESIOLOGY

## 2018-03-07 PROCEDURE — 25000301 ZZH OR RX SURGIFLO W/THROMBIN KIT 2ML 1991 OPNP: Performed by: ORTHOPAEDIC SURGERY

## 2018-03-07 PROCEDURE — 25000125 ZZHC RX 250: Performed by: ORTHOPAEDIC SURGERY

## 2018-03-07 PROCEDURE — 82962 GLUCOSE BLOOD TEST: CPT

## 2018-03-07 PROCEDURE — 37000009 ZZH ANESTHESIA TECHNICAL FEE, EACH ADDTL 15 MIN: Performed by: ORTHOPAEDIC SURGERY

## 2018-03-07 PROCEDURE — 36000066 ZZH SURGERY LEVEL 4 W FLUORO 1ST 30 MIN - UMMC: Performed by: ORTHOPAEDIC SURGERY

## 2018-03-07 PROCEDURE — 25000566 ZZH SEVOFLURANE, EA 15 MIN: Performed by: ORTHOPAEDIC SURGERY

## 2018-03-07 PROCEDURE — 25000128 H RX IP 250 OP 636: Performed by: NURSE ANESTHETIST, CERTIFIED REGISTERED

## 2018-03-07 PROCEDURE — 25000132 ZZH RX MED GY IP 250 OP 250 PS 637: Performed by: ANESTHESIOLOGY

## 2018-03-07 PROCEDURE — 37000008 ZZH ANESTHESIA TECHNICAL FEE, 1ST 30 MIN: Performed by: ORTHOPAEDIC SURGERY

## 2018-03-07 PROCEDURE — 25000132 ZZH RX MED GY IP 250 OP 250 PS 637: Performed by: ORTHOPAEDIC SURGERY

## 2018-03-07 PROCEDURE — 27210995 ZZH RX 272: Performed by: ORTHOPAEDIC SURGERY

## 2018-03-07 PROCEDURE — 72020 X-RAY EXAM OF SPINE 1 VIEW: CPT | Mod: 76

## 2018-03-07 PROCEDURE — 27211024 ZZHC OR SUPPLY OTHER OPNP: Performed by: ORTHOPAEDIC SURGERY

## 2018-03-07 PROCEDURE — 40000170 ZZH STATISTIC PRE-PROCEDURE ASSESSMENT II: Performed by: ORTHOPAEDIC SURGERY

## 2018-03-07 PROCEDURE — 36415 COLL VENOUS BLD VENIPUNCTURE: CPT | Performed by: ANESTHESIOLOGY

## 2018-03-07 PROCEDURE — 27210794 ZZH OR GENERAL SUPPLY STERILE: Performed by: ORTHOPAEDIC SURGERY

## 2018-03-07 RX ORDER — LISINOPRIL 10 MG/1
20 TABLET ORAL EVERY MORNING
Status: DISCONTINUED | OUTPATIENT
Start: 2018-03-08 | End: 2018-03-09 | Stop reason: HOSPADM

## 2018-03-07 RX ORDER — ONDANSETRON 2 MG/ML
INJECTION INTRAMUSCULAR; INTRAVENOUS PRN
Status: DISCONTINUED | OUTPATIENT
Start: 2018-03-07 | End: 2018-03-07

## 2018-03-07 RX ORDER — CHLORAL HYDRATE 500 MG
1000 CAPSULE ORAL 2 TIMES DAILY
Status: DISCONTINUED | OUTPATIENT
Start: 2018-03-08 | End: 2018-03-09 | Stop reason: HOSPADM

## 2018-03-07 RX ORDER — ACETAMINOPHEN 325 MG/1
650 TABLET ORAL EVERY 6 HOURS PRN
Status: DISCONTINUED | OUTPATIENT
Start: 2018-03-07 | End: 2018-03-09 | Stop reason: HOSPADM

## 2018-03-07 RX ORDER — METOCLOPRAMIDE 5 MG/1
5 TABLET ORAL EVERY 6 HOURS PRN
Status: DISCONTINUED | OUTPATIENT
Start: 2018-03-07 | End: 2018-03-09 | Stop reason: HOSPADM

## 2018-03-07 RX ORDER — FENTANYL CITRATE 50 UG/ML
25-50 INJECTION, SOLUTION INTRAMUSCULAR; INTRAVENOUS
Status: DISCONTINUED | OUTPATIENT
Start: 2018-03-07 | End: 2018-03-07 | Stop reason: HOSPADM

## 2018-03-07 RX ORDER — METOCLOPRAMIDE HYDROCHLORIDE 5 MG/ML
5 INJECTION INTRAMUSCULAR; INTRAVENOUS EVERY 6 HOURS PRN
Status: DISCONTINUED | OUTPATIENT
Start: 2018-03-07 | End: 2018-03-09 | Stop reason: HOSPADM

## 2018-03-07 RX ORDER — MAGNESIUM HYDROXIDE 1200 MG/15ML
LIQUID ORAL PRN
Status: DISCONTINUED | OUTPATIENT
Start: 2018-03-07 | End: 2018-03-07 | Stop reason: HOSPADM

## 2018-03-07 RX ORDER — FENTANYL CITRATE 50 UG/ML
INJECTION, SOLUTION INTRAMUSCULAR; INTRAVENOUS PRN
Status: DISCONTINUED | OUTPATIENT
Start: 2018-03-07 | End: 2018-03-07

## 2018-03-07 RX ORDER — LIDOCAINE 40 MG/G
CREAM TOPICAL
Status: DISCONTINUED | OUTPATIENT
Start: 2018-03-07 | End: 2018-03-07 | Stop reason: HOSPADM

## 2018-03-07 RX ORDER — ONDANSETRON 2 MG/ML
4 INJECTION INTRAMUSCULAR; INTRAVENOUS EVERY 6 HOURS PRN
Status: DISCONTINUED | OUTPATIENT
Start: 2018-03-07 | End: 2018-03-09 | Stop reason: HOSPADM

## 2018-03-07 RX ORDER — AMOXICILLIN 250 MG
1-2 CAPSULE ORAL 2 TIMES DAILY
Status: DISCONTINUED | OUTPATIENT
Start: 2018-03-07 | End: 2018-03-09 | Stop reason: HOSPADM

## 2018-03-07 RX ORDER — BUPIVACAINE HYDROCHLORIDE AND EPINEPHRINE 2.5; 5 MG/ML; UG/ML
INJECTION, SOLUTION INFILTRATION; PERINEURAL PRN
Status: DISCONTINUED | OUTPATIENT
Start: 2018-03-07 | End: 2018-03-07 | Stop reason: HOSPADM

## 2018-03-07 RX ORDER — VANCOMYCIN HYDROCHLORIDE 1 G/20ML
INJECTION, POWDER, LYOPHILIZED, FOR SOLUTION INTRAVENOUS PRN
Status: DISCONTINUED | OUTPATIENT
Start: 2018-03-07 | End: 2018-03-07 | Stop reason: HOSPADM

## 2018-03-07 RX ORDER — OXYCODONE HYDROCHLORIDE 5 MG/1
5-10 TABLET ORAL
Status: DISCONTINUED | OUTPATIENT
Start: 2018-03-07 | End: 2018-03-09 | Stop reason: HOSPADM

## 2018-03-07 RX ORDER — SODIUM CHLORIDE, SODIUM LACTATE, POTASSIUM CHLORIDE, CALCIUM CHLORIDE 600; 310; 30; 20 MG/100ML; MG/100ML; MG/100ML; MG/100ML
INJECTION, SOLUTION INTRAVENOUS CONTINUOUS
Status: DISCONTINUED | OUTPATIENT
Start: 2018-03-07 | End: 2018-03-07 | Stop reason: HOSPADM

## 2018-03-07 RX ORDER — EPHEDRINE SULFATE 50 MG/ML
INJECTION, SOLUTION INTRAMUSCULAR; INTRAVENOUS; SUBCUTANEOUS PRN
Status: DISCONTINUED | OUTPATIENT
Start: 2018-03-07 | End: 2018-03-07

## 2018-03-07 RX ORDER — HYDROCHLOROTHIAZIDE 25 MG/1
25 TABLET ORAL EVERY MORNING
Status: DISCONTINUED | OUTPATIENT
Start: 2018-03-08 | End: 2018-03-09 | Stop reason: HOSPADM

## 2018-03-07 RX ORDER — LIDOCAINE 40 MG/G
CREAM TOPICAL
Status: DISCONTINUED | OUTPATIENT
Start: 2018-03-07 | End: 2018-03-09 | Stop reason: HOSPADM

## 2018-03-07 RX ORDER — CEFAZOLIN SODIUM 2 G/100ML
2 INJECTION, SOLUTION INTRAVENOUS EVERY 8 HOURS
Status: COMPLETED | OUTPATIENT
Start: 2018-03-07 | End: 2018-03-08

## 2018-03-07 RX ORDER — ATENOLOL 25 MG/1
12.5 TABLET ORAL EVERY MORNING
Status: DISCONTINUED | OUTPATIENT
Start: 2018-03-08 | End: 2018-03-09 | Stop reason: HOSPADM

## 2018-03-07 RX ORDER — CEFAZOLIN SODIUM 2 G/100ML
2 INJECTION, SOLUTION INTRAVENOUS
Status: COMPLETED | OUTPATIENT
Start: 2018-03-07 | End: 2018-03-07

## 2018-03-07 RX ORDER — CEFAZOLIN SODIUM 1 G/3ML
1 INJECTION, POWDER, FOR SOLUTION INTRAMUSCULAR; INTRAVENOUS SEE ADMIN INSTRUCTIONS
Status: DISCONTINUED | OUTPATIENT
Start: 2018-03-07 | End: 2018-03-07 | Stop reason: HOSPADM

## 2018-03-07 RX ORDER — ALBUMIN, HUMAN INJ 5% 5 %
SOLUTION INTRAVENOUS CONTINUOUS PRN
Status: DISCONTINUED | OUTPATIENT
Start: 2018-03-07 | End: 2018-03-07

## 2018-03-07 RX ORDER — PROCHLORPERAZINE MALEATE 5 MG
5 TABLET ORAL EVERY 6 HOURS PRN
Status: DISCONTINUED | OUTPATIENT
Start: 2018-03-07 | End: 2018-03-09 | Stop reason: HOSPADM

## 2018-03-07 RX ORDER — NALOXONE HYDROCHLORIDE 0.4 MG/ML
.1-.4 INJECTION, SOLUTION INTRAMUSCULAR; INTRAVENOUS; SUBCUTANEOUS
Status: DISCONTINUED | OUTPATIENT
Start: 2018-03-07 | End: 2018-03-07

## 2018-03-07 RX ORDER — ONDANSETRON 4 MG/1
4 TABLET, ORALLY DISINTEGRATING ORAL EVERY 6 HOURS PRN
Status: DISCONTINUED | OUTPATIENT
Start: 2018-03-07 | End: 2018-03-09 | Stop reason: HOSPADM

## 2018-03-07 RX ORDER — DEXAMETHASONE SODIUM PHOSPHATE 4 MG/ML
INJECTION, SOLUTION INTRA-ARTICULAR; INTRALESIONAL; INTRAMUSCULAR; INTRAVENOUS; SOFT TISSUE PRN
Status: DISCONTINUED | OUTPATIENT
Start: 2018-03-07 | End: 2018-03-07

## 2018-03-07 RX ORDER — ONDANSETRON 4 MG/1
4 TABLET, ORALLY DISINTEGRATING ORAL EVERY 30 MIN PRN
Status: DISCONTINUED | OUTPATIENT
Start: 2018-03-07 | End: 2018-03-07 | Stop reason: HOSPADM

## 2018-03-07 RX ORDER — ATORVASTATIN CALCIUM 40 MG/1
40 TABLET, FILM COATED ORAL AT BEDTIME
Status: DISCONTINUED | OUTPATIENT
Start: 2018-03-07 | End: 2018-03-09 | Stop reason: HOSPADM

## 2018-03-07 RX ORDER — OXYCODONE HYDROCHLORIDE 5 MG/1
5 TABLET ORAL EVERY 4 HOURS PRN
Status: DISCONTINUED | OUTPATIENT
Start: 2018-03-07 | End: 2018-03-07

## 2018-03-07 RX ORDER — FLUTICASONE PROPIONATE 50 MCG
2 SPRAY, SUSPENSION (ML) NASAL 2 TIMES DAILY PRN
Status: DISCONTINUED | OUTPATIENT
Start: 2018-03-07 | End: 2018-03-09 | Stop reason: HOSPADM

## 2018-03-07 RX ORDER — NALOXONE HYDROCHLORIDE 0.4 MG/ML
.1-.4 INJECTION, SOLUTION INTRAMUSCULAR; INTRAVENOUS; SUBCUTANEOUS
Status: DISCONTINUED | OUTPATIENT
Start: 2018-03-07 | End: 2018-03-09 | Stop reason: HOSPADM

## 2018-03-07 RX ORDER — HYDROMORPHONE HCL/0.9% NACL/PF 0.2MG/0.2
0.2 SYRINGE (ML) INTRAVENOUS
Status: DISCONTINUED | OUTPATIENT
Start: 2018-03-07 | End: 2018-03-09 | Stop reason: HOSPADM

## 2018-03-07 RX ORDER — LIDOCAINE HYDROCHLORIDE 20 MG/ML
INJECTION, SOLUTION INFILTRATION; PERINEURAL PRN
Status: DISCONTINUED | OUTPATIENT
Start: 2018-03-07 | End: 2018-03-07

## 2018-03-07 RX ORDER — SODIUM CHLORIDE 9 MG/ML
INJECTION, SOLUTION INTRAVENOUS CONTINUOUS
Status: DISCONTINUED | OUTPATIENT
Start: 2018-03-07 | End: 2018-03-09 | Stop reason: HOSPADM

## 2018-03-07 RX ORDER — PROPOFOL 10 MG/ML
INJECTION, EMULSION INTRAVENOUS PRN
Status: DISCONTINUED | OUTPATIENT
Start: 2018-03-07 | End: 2018-03-07

## 2018-03-07 RX ORDER — ONDANSETRON 2 MG/ML
4 INJECTION INTRAMUSCULAR; INTRAVENOUS EVERY 30 MIN PRN
Status: DISCONTINUED | OUTPATIENT
Start: 2018-03-07 | End: 2018-03-07 | Stop reason: HOSPADM

## 2018-03-07 RX ADMIN — FENTANYL CITRATE 50 MCG: 50 INJECTION, SOLUTION INTRAMUSCULAR; INTRAVENOUS at 13:06

## 2018-03-07 RX ADMIN — FENTANYL CITRATE 50 MCG: 50 INJECTION, SOLUTION INTRAMUSCULAR; INTRAVENOUS at 15:24

## 2018-03-07 RX ADMIN — PHENYLEPHRINE HYDROCHLORIDE 0.2 MCG/KG/MIN: 10 INJECTION, SOLUTION INTRAMUSCULAR; INTRAVENOUS; SUBCUTANEOUS at 13:45

## 2018-03-07 RX ADMIN — ACETAMINOPHEN 650 MG: 325 TABLET, FILM COATED ORAL at 21:04

## 2018-03-07 RX ADMIN — FENTANYL CITRATE 50 MCG: 50 INJECTION, SOLUTION INTRAMUSCULAR; INTRAVENOUS at 15:15

## 2018-03-07 RX ADMIN — OXYCODONE HYDROCHLORIDE 5 MG: 5 TABLET ORAL at 21:04

## 2018-03-07 RX ADMIN — ONDANSETRON 4 MG: 2 INJECTION INTRAMUSCULAR; INTRAVENOUS at 14:31

## 2018-03-07 RX ADMIN — ALBUMIN (HUMAN): 12.5 SOLUTION INTRAVENOUS at 13:22

## 2018-03-07 RX ADMIN — PHENYLEPHRINE HYDROCHLORIDE 50 MCG: 10 INJECTION, SOLUTION INTRAMUSCULAR; INTRAVENOUS; SUBCUTANEOUS at 13:37

## 2018-03-07 RX ADMIN — ROCURONIUM BROMIDE 50 MG: 10 INJECTION INTRAVENOUS at 12:37

## 2018-03-07 RX ADMIN — LIDOCAINE HYDROCHLORIDE 40 MG: 20 INJECTION, SOLUTION INFILTRATION; PERINEURAL at 12:36

## 2018-03-07 RX ADMIN — Medication 5 MG: at 13:18

## 2018-03-07 RX ADMIN — FENTANYL CITRATE 50 MCG: 50 INJECTION, SOLUTION INTRAMUSCULAR; INTRAVENOUS at 12:36

## 2018-03-07 RX ADMIN — Medication 0.5 MG: at 15:33

## 2018-03-07 RX ADMIN — PHENYLEPHRINE HYDROCHLORIDE 50 MCG: 10 INJECTION, SOLUTION INTRAMUSCULAR; INTRAVENOUS; SUBCUTANEOUS at 13:21

## 2018-03-07 RX ADMIN — HYDROMORPHONE HYDROCHLORIDE 0.25 MG: 1 INJECTION, SOLUTION INTRAMUSCULAR; INTRAVENOUS; SUBCUTANEOUS at 14:27

## 2018-03-07 RX ADMIN — ROCURONIUM BROMIDE 10 MG: 10 INJECTION INTRAVENOUS at 13:15

## 2018-03-07 RX ADMIN — OXYCODONE HYDROCHLORIDE 5 MG: 5 TABLET ORAL at 17:11

## 2018-03-07 RX ADMIN — PHENYLEPHRINE HYDROCHLORIDE 50 MCG: 10 INJECTION, SOLUTION INTRAMUSCULAR; INTRAVENOUS; SUBCUTANEOUS at 14:45

## 2018-03-07 RX ADMIN — ATORVASTATIN CALCIUM 40 MG: 40 TABLET, FILM COATED ORAL at 21:04

## 2018-03-07 RX ADMIN — SUGAMMADEX 190 MG: 100 INJECTION, SOLUTION INTRAVENOUS at 14:57

## 2018-03-07 RX ADMIN — CEFAZOLIN SODIUM 2 G: 2 INJECTION, SOLUTION INTRAVENOUS at 13:00

## 2018-03-07 RX ADMIN — SODIUM CHLORIDE, POTASSIUM CHLORIDE, SODIUM LACTATE AND CALCIUM CHLORIDE: 600; 310; 30; 20 INJECTION, SOLUTION INTRAVENOUS at 12:30

## 2018-03-07 RX ADMIN — MIDAZOLAM 1 MG: 1 INJECTION INTRAMUSCULAR; INTRAVENOUS at 12:30

## 2018-03-07 RX ADMIN — SENNOSIDES AND DOCUSATE SODIUM 2 TABLET: 8.6; 5 TABLET ORAL at 21:04

## 2018-03-07 RX ADMIN — HYDROMORPHONE HYDROCHLORIDE 0.25 MG: 1 INJECTION, SOLUTION INTRAMUSCULAR; INTRAVENOUS; SUBCUTANEOUS at 14:31

## 2018-03-07 RX ADMIN — Medication 5 MG: at 13:21

## 2018-03-07 RX ADMIN — DEXAMETHASONE SODIUM PHOSPHATE 10 MG: 4 INJECTION, SOLUTION INTRAMUSCULAR; INTRAVENOUS at 14:13

## 2018-03-07 RX ADMIN — PROPOFOL 150 MG: 10 INJECTION, EMULSION INTRAVENOUS at 12:37

## 2018-03-07 RX ADMIN — CEFAZOLIN SODIUM 2 G: 2 INJECTION, SOLUTION INTRAVENOUS at 21:09

## 2018-03-07 RX ADMIN — PHENYLEPHRINE HYDROCHLORIDE 50 MCG: 10 INJECTION, SOLUTION INTRAMUSCULAR; INTRAVENOUS; SUBCUTANEOUS at 13:44

## 2018-03-07 RX ADMIN — PHENYLEPHRINE HYDROCHLORIDE 50 MCG: 10 INJECTION, SOLUTION INTRAMUSCULAR; INTRAVENOUS; SUBCUTANEOUS at 14:39

## 2018-03-07 RX ADMIN — PHENYLEPHRINE HYDROCHLORIDE 50 MCG: 10 INJECTION, SOLUTION INTRAMUSCULAR; INTRAVENOUS; SUBCUTANEOUS at 13:35

## 2018-03-07 ASSESSMENT — PAIN DESCRIPTION - DESCRIPTORS: DESCRIPTORS: ACHING

## 2018-03-07 NOTE — TELEPHONE ENCOUNTER
Patient called today to report no fver for 24 hours.  Non productive cough.  Feels no cold symptoms..  Will come to [facility] for surgery today.

## 2018-03-07 NOTE — IP AVS SNAPSHOT
MRN:[id number]                      After Visit Summary   3/7/2018    Edwin Ellison    MRN: [id number]           Thank you!     Thank you for choosing [facility] for your care. Our goal is always to provide you with excellent care. Hearing back from our patients is one way we can continue to improve our services. Please take a few minutes to complete the written survey that you may receive in the mail after you visit with us. Thank you!        Patient Information     Date Of Birth          1945        Designated Caregiver       Most Recent Value    Caregiver    Will someone help with your care after discharge? yes    Name of designated caregiver Lizzy    Phone number of caregiver [phone]     Caregiver address formerly [facility]      About your hospital stay     You were admitted on:  March 7, 2018 You last received care in the:  [facility] [address] [facility]    You were discharged on:  March 9, 2018        Reason for your hospital stay       You had spine surgery                  Who to Call     For medical emergencies, please call 911.  For non-urgent questions about your medical care, please call your primary care provider or clinic, None  For questions related to your surgery, please call your surgery clinic        Attending Provider     Provider Leo Berkowitz MD Orthopedics       Primary Care Provider Fax #    [facility] [phone]       When to contact your care team       CALL YOUR PHYSICIAN IF:  1.  Your pain begins to worsen and is unable to be controlled with your medications.  2.  Excessive redness or drainage of cloudy or bloody material from the wounds (Clear red tinted fluid and some mild drainage should be expected). Drainage of any kind 5 days after surgery should be reported to the doctor.  3.  You have a temperature elevation greater than 101.5    4.  You have pain, swelling or redness in your calf. You have numbness or weakness in your leg or  foot.    During regular business hours call the [facility] at [phone] and ask for the triage nurse. After hours or weekends call the hospital  at [phone] and ask for the ortho resident on call.                  After Care Instructions     Diet       Follow this diet upon discharge: resume your usual pre operative diet            Diet Instructions       Resume pre-procedure diet            Discharge Instructions       Bathing and Wound Care:  Please leave the dressing in place for 5 days as a minimum. Before the dressing comes off, you should not get it wet and should sponge bathe around the wound.  After 5 days you may take the dressing off and leave it open to air.  Once the dressing is off, you may shower normally and let water gently fall over the wound and then pat it dry.  Do not put any creams or lotions on the wound.  Contact us with any new drainage or worsening redness or irritation of the wound.  We DO want you to be up and walking at least 30 minutes per day to prevent generalized deconditioning.        General Information:  Your wound should remain relatively dry.  If it has a few tiny dots or spots of drainage on it, that is OK, but if there is continued wetness or drainage beyond this please contact the clinic.  Contact us with any redness around the wound or if you have more than 24 consecutive hours of temperature over 101 degrees F.  A single fever that goes away is OK and is nothing to be alarmed about.  Contact us immediately with any new numbness or weakness in the legs or arms.  Call [phone] and ask to speak to Dr James's nurse or the triage nurse. After hours or on weekends call the hospital  at [phone] and ask to speak to the ortho resident on call.    Avoid strenuous activity. No bending, twisting, lifting anything over 1- lbs.            Dressing       Keep dressing clean and dry.  Dressing / incisional care as instructed by RN and or Surgeon            No  "driving or operating machinery        Until you stop taking narcotic medications.            No lifting        No lifting over 10 lbs and no strenuous physical activity for 6 weeks            Weight bearing status - As tolerated                 Follow-up Appointments     Follow Up and recommended labs and tests       Follow up with Dr James in 6 weeks as planned. Call the clinic with any questions regarding this appointment. [phone].                  Future tests that were ordered for you     Assign Questionnaire Series to Patient                 Pending Results     No orders found from 3/5/2018 to 3/8/2018.            Statement of Approval     Ordered          03/09/18 1414  I have reviewed and agree with all the recommendations and orders detailed in this document.  EFFECTIVE NOW     Approved and electronically signed by:  Gail Wynn APRN CNP             Admission Information     Date & Time Provider Department Dept. Phone    3/7/2018 Leo James MD [facility] [phone]      Your Vitals Were     Blood Pressure Pulse Temperature Respirations Height Weight    109/58 57 96  F (35.6  C) (Oral) 16 1.778 m (5' 10\") 93.4 kg (205 lb 14.6 oz)    Pulse Oximetry BMI (Body Mass Index)                98% 29.54 kg/m2          MyChart Information     [facility] gives you secure access to your electronic health record. If you see a primary care provider, you can also send messages to your care team and make appointments. If you have questions, please call your primary care clinic.  If you do not have a primary care provider, please call [phone] and they will assist you.        Care EveryWhere ID     This is your Care EveryWhere ID. This could be used by other organizations to access your [facility] medical records  [id number]        Equal Access to Services     PEYTON VILLASENOR : Vladimir Christianson, walosda luqadaha, qaybta kaceline hunter. " So [facility] [phone].    ATENCIÓN: Si emilianala nirmala, tiene a ma disposición servicios gratuitos de asistencia lingüística. Janusz al [phone].    We comply with applicable federal civil rights laws and [place] laws. We do not discriminate on the basis of race, color, national origin, age, disability, sex, sexual orientation, or gender identity.               Review of your medicines      START taking        Dose / Directions    acetaminophen 325 MG tablet   Commonly known as:  TYLENOL        Dose:  650 mg   Take 2 tablets (650 mg) by mouth every 6 hours as needed for mild pain   Quantity:  100 tablet   Refills:  0       bisacodyl 10 MG Suppository   Commonly known as:  DULCOLAX        Dose:  10 mg   Place 1 suppository (10 mg) rectally daily as needed for constipation   Quantity:  2 suppository   Refills:  0       oxyCODONE IR 5 MG tablet   Commonly known as:  ROXICODONE        Dose:  5-10 mg   Take 1-2 tablets (5-10 mg) by mouth every 4 hours as needed for other (pain control or improvement in physical function. Hold dose for analgesic side effects.)   Quantity:  60 tablet   Refills:  0       senna-docusate 8.6-50 MG per tablet   Commonly known as:  SENOKOT-S;PERICOLACE        Dose:  1-2 tablet   Take 1-2 tablets by mouth 2 times daily   Quantity:  100 tablet   Refills:  0         CONTINUE these medicines which have NOT CHANGED        Dose / Directions    ASPIRIN PO        Dose:  325 mg   Take 325 mg by mouth At Bedtime   Refills:  0       ATENOLOL PO        Dose:  12.5 mg   Take 12.5 mg by mouth every morning   Refills:  0       ATORVASTATIN CALCIUM PO        Dose:  40 mg   Take 40 mg by mouth At Bedtime   Refills:  0       Fish Oil 1000 MG Cpdr        Dose:  1000 mg   Take 1,000 mg by mouth 2 times daily   Refills:  0       FLUTICASONE FUROATE NA        Spray in nostril 2 times daily   Refills:  0       HYDROCHLOROTHIAZIDE PO        Dose:  25 mg   Take 25 mg by mouth every morning   Refills:  0        LISINOPRIL PO        Dose:  20 mg   Take 20 mg by mouth every morning   Refills:  0       ranitidine 150 MG tablet   Commonly known as:  ZANTAC        Dose:  150 mg   Take 1 tablet (150 mg) by mouth daily   Quantity:  60 tablet   Refills:  0            Where to get your medicines      These medications were sent to [facility] [place], [place] - [address]  [address], [facility] [address]     Phone:  [phone]     acetaminophen 325 MG tablet    bisacodyl 10 MG Suppository         Some of these will need a paper prescription and others can be bought over the counter. Ask your nurse if you have questions.     Bring a paper prescription for each of these medications     oxyCODONE IR 5 MG tablet                Protect others around you: Learn how to safely use, store and throw away your medicines at www.disposemymeds.org.        Information about OPIOIDS     PRESCRIPTION OPIOIDS: WHAT YOU NEED TO KNOW    Prescription opioids can be used to help relieve moderate to severe pain and are often prescribed following a surgery or injury, or for certain health conditions. These medications can be an important part of treatment but also come with serious risks. It is important to work with your health care provider to make sure you are getting the safest, most effective care.    WHAT ARE THE RISKS AND SIDE EFFECTS OF OPIOID USE?  Prescription opioids carry serious risks of addiction and overdose, especially with prolonged use. An opioid overdose, often marked by slowed breathing can cause sudden death. The use of prescription opioids can have a number of side effects as well, even when taken as directed:      Tolerance - meaning you might need to take more of a medication for the same pain relief    Physical dependence - meaning you have symptoms of withdrawal when a medication is stopped    Increased sensitivity to pain    Constipation    Nausea, vomiting, and dry mouth    Sleepiness and  dizziness    Confusion    Depression    Low levels of testosterone that can result in lower sex drive, energy, and strength    Itching and sweating    RISKS ARE GREATER WITH:    History of drug misuse, substance use disorder, or overdose    Mental health conditions (such as depression or anxiety)    Sleep apnea    Older age (65 years or older)    Pregnancy    Avoid alcohol while taking prescription opioids.   Also, unless specifically advised by your health care provider, medications to avoid include:    Benzodiazepines (such as Xanax or Valium)    Muscle relaxants (such as Soma or Flexeril)    Hypnotics (such as Ambien or Lunesta)    Other prescription opioids    KNOW YOUR OPTIONS:  Talk to your health care provider about ways to manage your pain that do not involve prescription opioids. Some of these options may actually work better and have fewer risks and side effects:    Pain relievers such as acetaminophen, ibuprofen, and naproxen    Some medications that are also used for depression or seizures    Physical therapy and exercise    Cognitive behavioral therapy, a psychological, goal-directed approach, in which patients learn how to modify physical, behavioral, and emotional triggers of pain and stress    IF YOU ARE PRESCRIBED OPIOIDS FOR PAIN:    Never take opioids in greater amounts or more often than prescribed    Follow up with your primary health care provider and work together to create a plan on how to manage your pain.    Talk about ways to help manage your pain that do not involve prescription opioids    Talk about all concerns and side effects    Help prevent misuse and abuse    Never sell or share prescription opioids    Never use another person's prescription opioids    Store prescription opioids in a secure place and out of reach of others (this may include visitors, children, friends, and family)    Visit www.cdc.gov/drugoverdose to learn about risks of opioid abuse and overdose    If you believe  you may be struggling with addiction, tell your health care provider and ask for guidance or call [facility]'s National Helpline at 5-187-899-HELP    LEARN MORE / www.cdc.gov/drugoverdose/prescribing/guideline.html    Safely dispose of unused prescription opioids: Find your local drug take-back programs and more information about the importance of safe disposal at www.doseofreality.[place].gov             Medication List: This is a list of all your medications and when to take them. Check marks below indicate your daily home schedule. Keep this list as a reference.      Medications           Morning Afternoon Evening Bedtime As Needed    acetaminophen 325 MG tablet   Commonly known as:  TYLENOL   Take 2 tablets (650 mg) by mouth every 6 hours as needed for mild pain   Last time this was given:  650 mg on 3/9/2018  9:08 AM                            EVERY 6 HOURS AS NEEDED       ASPIRIN PO   Take 325 mg by mouth At Bedtime                                ATENOLOL PO   Take 12.5 mg by mouth every morning                                ATORVASTATIN CALCIUM PO   Take 40 mg by mouth At Bedtime   Last time this was given:  40 mg on 3/8/2018  9:12 PM   Next Dose Due:  3/9/2018 10PM                        10PM           bisacodyl 10 MG Suppository   Commonly known as:  DULCOLAX   Place 1 suppository (10 mg) rectally daily as needed for constipation                            DAILY AS NEEDED       Fish Oil 1000 MG Cpdr   Take 1,000 mg by mouth 2 times daily                                FLUTICASONE FUROATE NA   [place] in nostril 2 times daily                                HYDROCHLOROTHIAZIDE PO   Take 25 mg by mouth every morning   Last time this was given:  25 mg on 3/9/2018  9:09 AM   Next Dose Due:  3/10/2018 8AM            8AM                       LISINOPRIL PO   Take 20 mg by mouth every morning   Last time this was given:  20 mg on 3/8/2018  8:30 AM   Next Dose Due:  3/10/2018            8AM                       oxyCODONE IR  5 MG tablet   Commonly known as:  ROXICODONE   Take 1-2 tablets (5-10 mg) by mouth every 4 hours as needed for other (pain control or improvement in physical function. Hold dose for analgesic side effects.)   Last time this was given:  10 mg on 3/9/2018 12:32 PM                            EVERY 4 HOURS AS NEEDED       ranitidine 150 MG tablet   Commonly known as:  ZANTAC   Take 1 tablet (150 mg) by mouth daily   Last time this was given:  150 mg on 3/9/2018  9:09 AM   Next Dose Due:  3/10/2018 8AM            8AM                       senna-docusate 8.6-50 MG per tablet   Commonly known as:  SENOKOT-S;PERICOLACE   Take 1-2 tablets by mouth 2 times daily   Last time this was given:  2 tablets on 3/9/2018  9:08 AM   Next Dose Due:  3/9/2018 8PM            8AM           8PM

## 2018-03-07 NOTE — BRIEF OP NOTE
[facility], [place]    Orthopaedic Surgery  Brief Operative Note    Pre-operative diagnosis: Lumbar Stenosis and Neurogenic Claudication   Post-operative diagnosis Same   Procedure: Procedure(s):  Lumbar 3-5 Decompression - Wound Class: I-Clean   Surgeon: Leo James MD   Assistants(s): Broderick Daugherty MD Res   Anesthesia: General    Estimated blood loss: 50 mL   Total IV fluids: (See anesthesia record)   Blood transfusion: (See anesthesia record)   Total urine output: (See anesthesia record)   Drains: MEME   Specimens: * No specimens in log *   Findings: None   Complications: None   Implants: None       Plan:  Admit to Ortho outpatient in bed  Activity: Up with assist.  Weight bearing status: WBAT  Antibiotics: Ancef x 24 hours.  Diet: Begin with clear fluids and progress diet as tolerated.  DVT prophylaxis: Mechanical only, hold aspirin for 5 days  Bracing/Splinting: None  Wound Care: Aquacel to remain in place until POD#7  Drains: Document output per shift, discontinue when <40 cc/shift.  Pain management: transition from IV to orals as tolerated.   X-rays: None needed  Physical Therapy: ROM, ADL's.  Occupational Therapy: ADL's.  Labs: None necessary without complication  Consults: PT, OT. appreciate assistance in caring for this patient.  Follow-up: Clinic with Dr. James in 6 weeks (on or around 4/21).    Disposition: Pending progress with therapies, pain control on orals, and medical stability, anticipate discharge to home on POD #1-2.    Broderick Daugherty MD 03/07/2018  Orthopaedic Surgery Resident, PGY-1  Pager: [phone]    For questions about this patient, please attempt to contact me at my pager prior to contacting the orthopaedics resident on call. Thank you!

## 2018-03-07 NOTE — OR NURSING
PACU to Inpatient Nursing Handoff    Patient Edwin Ellison is a 72 year old male who speaks English.   Procedure Procedure(s):  Lumbar 3-5 Decompression - Wound Class: I-Clean   Surgeon(s) Primary: Leo James MD  Resident - Assisting: Broderick Daugherty MD     Allergies   Allergen Reactions     Niacin Unknown       Isolation  No active isolations    Past Medical History   has a past medical history of Arthritis; Coronary artery disease; GERD (gastroesophageal reflux disease); History of blood transfusion; History of tobacco use; Hyperlipidemia; Hypertension; and Prediabetes.    Anesthesia General   Dermatome Level     Preop Meds Not applicable   Nerve block Not applicable   Intraop Meds dexamethasone (Decadron)  fentanyl (Sublimaze): 100 mcg total  hydromorphone (Dilaudid): 0.5 mg total  ondansetron (Zofran): last given at 1431   Local Meds Yes   Antibiotics cefazolin (Ancef) - last given at 1300     Pain Patient Currently in Pain: yes  Comfort: tolerable with discomfort  Pain Control: partially effective   PACU meds  fentanyl (Sublimaze): 100 mcg (total dose) last given at 1300    PCA / epidural No   Capnography     Telemetry ECG Rhythm: Right bundle branch block      Labs Glucose Lab Results   Component Value Date    GLC 82 02/13/2018       Hgb Lab Results   Component Value Date    HGB 14.7 02/13/2018       INR No results found for: INR   PACU Imaging Not applicable     Wound/Incision Incision/Surgical Site 03/07/18 Midline;Lower Back (Active)   Incision Assessment UTV 3/7/2018  3:01 PM   Celia-Incision Assessment UTV 3/7/2018  3:01 PM   Closure Liquid bandage 3/7/2018  3:01 PM   Incision Drainage Amount None 3/7/2018  3:01 PM   Dressing Intervention Clean, dry, intact 3/7/2018  3:01 PM   Number of days:0      CMS Peripheral Neurovascular WDL: WDL (03/07/18 1501)  All Extremities Temperature: warm (03/07/18 1501)  LLE Temperature: warm (03/07/18 1501)  LLE Color: no discoloration (03/07/18 1501)  LLE  Sensation: no tingling;no numbness (03/07/18 1501)  RLE Temperature: warm (03/07/18 1501)  RLE Color: no discoloration (03/07/18 1501)  RLE Sensation: no tingling;no numbness (03/07/18 1501)   Equipment ice pack   Other LDA       IV Access Peripheral IV 03/07/18 Right Hand (Active)   Site Assessment WDL 3/7/2018  4:15 PM   Line Status Infusing 3/7/2018  4:15 PM   Phlebitis Scale 0-->no symptoms 3/7/2018  4:15 PM   Number of days:0      Blood Products Not applicable EBL 50 mL   Intake/Output Date 03/07/18 0700 - 03/08/18 0659   Shift 9496-9287 0966-3426 6385-0142 24 Hour Total   I  N  T  A  K  E   P.O.  200  200    I.V. 700 100  800    Colloid 250   250    Shift Total  (mL/kg) 950  (10.17) 300  (3.21)  1250  (13.38)   O  U  T  P  U  T   Urine 150 80  230    Drains  5  5    Blood 50   50    Shift Total  (mL/kg) 200  (2.14) 85  (0.91)  285  (3.05)   Weight (kg) 93.4 93.4 93.4 93.4        Drains / [place] Closed/Suction Drain Back Bulb 15 [place] citizen (Active)   Site Description WDL 3/7/2018  4:15 PM   Dressing Status Normal: Clean, Dry & Intact 3/7/2018  4:15 PM   Output (ml) 5 ml 3/7/2018  4:00 PM   Number of days:0       Urethral Catheter Straight-tip;Latex 16 fr (Active)   Tube Description UTV 3/7/2018  4:15 PM   Collection Container Standard 3/7/2018  4:15 PM   Securement Method Securing device (Describe) 3/7/2018  4:15 PM   Urine Output 80 mL 3/7/2018  4:15 PM   Number of days:0      Time of void PreOp Void Prior to Procedure: 1100 (03/07/18 1122)    PostOp     Bladder Scan      mL (03/07/18 1600)  tolerating sips     Vitals    B/P: 130/69  T: 98  F (36.7  C)    Temp src: Oral  P:  Pulse: 54 (03/07/18 1045)    Heart Rate: 60 (03/07/18 1545)     R: 14  O2:  SpO2: 95 %    O2 Device: None (Room air) (03/07/18 1545)    Oxygen Delivery: 6 LPM (03/07/18 1501)         Family/support present yes   Patient belongings Patient Belongings: glasses;shoes;clothing  Disposition of Belongings: Locker   Patient transported on cart    DC meds/scripts (obs/outpt) Not applicable     Special needs/considerations None   Tasks needing completion None       Nikki Wilks RN  ASCOM [id number]

## 2018-03-07 NOTE — ANESTHESIA PREPROCEDURE EVALUATION
Anesthesia Evaluation     . Pt has had prior anesthetic. Type: General    No history of anesthetic complications          ROS/MED HX    ENT/Pulmonary: Comment: Cold symptoms 5 days ago.  Clear sputum today, no fever.  Symptoms resolved 2 days ago.    (+)SHERON risk factors snores loudly, hypertension, , . .    Neurologic:     (+)neuropathy - Right foot drop,     Cardiovascular:     (+) hypertension-range: 145/81, -CAD, --stent,2010  1 . Taking blood thinners Pt has received instructions: Instructions Given to patient: Stop aspirin one week before surgery. . . :. Irregular Heartbeat/Palpitations, . Previous cardiac testing date:results:date: results:ECG reviewed date:2/13/2018 results:Sinus bradycardia. RBBB. No changes from previous EKG. date: results:          METS/Exercise Tolerance:     Hematologic:  - neg hematologic  ROS       Musculoskeletal:         GI/Hepatic:     (+) GERD Asymptomatic on medication,       Renal/Genitourinary:  - ROS Renal section negative       Endo:  - neg endo ROS       Psychiatric:         Infectious Disease:  - neg infectious disease ROS       Malignancy:      - no malignancy   Other:                     Physical Exam  Normal systems: cardiovascular, pulmonary and dental    Airway   Mallampati: II  TM distance: >3 FB  Neck ROM: full    Dental     Cardiovascular   Rhythm and rate: regular and normal      Pulmonary    breath sounds clear to auscultation                    Anesthesia Plan      History & Physical Review  History and physical reviewed and following examination; no interval change.    ASA Status:  2 .    NPO Status:  > 2 hours and > 8 hours    Plan for General and ETT with Intravenous and Propofol induction. Maintenance will be Balanced.    PONV prophylaxis:  Ondansetron (or other 5HT-3)  Reviewed GA risks.  All questions answered.  Pt agrees with plan and wishes to proceed.      Postoperative Care  Postoperative pain management:  IV analgesics and Oral pain medications.       Consents  Anesthetic plan, risks, benefits and alternatives discussed with:  Patient..                          .

## 2018-03-07 NOTE — ANESTHESIA CARE TRANSFER NOTE
Patient: Edwin Lobe    Procedure(s):  Lumbar 3-5 Decompression - Wound Class: I-Clean    Diagnosis: Lumbar Stenosis and Neurogenic Claudication  Diagnosis Additional Information: No value filed.    Anesthesia Type:   General, ETT     Note:  Airway :Face Mask  Patient transferred to:PACU  Handoff Report: Identifed the Patient, Identified the Reponsible Provider, Reviewed the pertinent medical history, Discussed the surgical course, Reviewed Intra-OP anesthesia mangement and issues during anesthesia, Set expectations for post-procedure period and Allowed opportunity for questions and acknowledgement of understanding      Vitals: (Last set prior to Anesthesia Care Transfer)    CRNA VITALS  3/7/2018 1430 - 3/7/2018 1507      3/7/2018             Pulse: 70    SpO2: 98 %                Electronically Signed By: LAURENT Estrada CRNA  March 7, 2018  3:07 PM

## 2018-03-07 NOTE — ANESTHESIA POSTPROCEDURE EVALUATION
Patient: Edwin Lobe    Procedure(s):  Lumbar 3-5 Decompression - Wound Class: I-Clean    Diagnosis:Lumbar Stenosis and Neurogenic Claudication  Diagnosis Additional Information: No value filed.    Anesthesia Type:  General, ETT    Note:  Anesthesia Post Evaluation    Patient location during evaluation: PACU and Bedside  Patient participation: Able to fully participate in evaluation  Level of consciousness: awake and alert  Pain management: adequate  Airway patency: patent  Cardiovascular status: acceptable  Respiratory status: acceptable  Hydration status: balanced  PONV: none     Anesthetic complications: None          Last vitals:  Vitals:    03/07/18 1045 03/07/18 1501 03/07/18 1515   BP: 145/81 148/80 144/71   Pulse: 54     Resp: 16 17 18   Temp: 36.6  C (97.9  F) 36.6  C (97.9  F)    SpO2: 97% 100% 100%         Electronically Signed By: Capri Love MD  March 7, 2018  3:43 PM

## 2018-03-07 NOTE — LETTER
Transition Communication Hand-off for Care Transitions to Next Level of Care Provider    Name: Edwin Ellison  : 1945  MRN #: [id number]  Primary Care Provider: [facility]     Primary Clinic: [facility] [id number]     Reason for Hospitalization:  Lumbar Stenosis and Neurogenic Claudication  Status post lumbar spine surgery for decompression of spinal cord  Status post lumbar spine surgery for decompression of spinal cord  +++ OUTSIDE RECORDS SCAN  +++  Status post lumbar spine surgery for decompression of spinal cord  Admit Date/Time: 3/7/2018 10:12 AM  Discharge Date: 3/9/18  Payor Source: Payor: COMMERCIAL / Plan: [facility] CHOICE PROGRAM / Product Type: PPO /     Reason for Communication Hand-off Referral: Patient of Dr James    Discharge Plan: home       AVS/Discharge Summary included

## 2018-03-07 NOTE — PROGRESS NOTES
"Orthopaedic Post-op Check    S:  Doing well post-operatively.  Reports pain is under good control with minimal nausea/vomiting.  No new numbness/tingling, states the numbness that he previously had in his right foot has completely resolved.  \"It feels like I could run a marathon.\"    O:    General:  Pain is well controlled, no acute distress  Resp:  Breathing normally on room air  MSK:  Dressing C/D/I  Motor:  Fires tibialis anterior, gastroc/soleus. EHL strength 3/5 on the R, 5/5 on the left.  Improved from pre-op where EHL was 2/5.    Sensory: SILT to superficial peroneal, deep peroneal, saphenous, sural, and tibial nerve territories  Circulation: palpable DP and TP, foot warm and well perfused    A/P:    Edwin Ellison is a 72 year old gentleman  who is POD#0 status-post L3-L5 decompression with Dr. James.  Doing well in the immediate post-op period.    Continue cares as ordered.  We will observe him overnight for pain control, drain monitoring, and Rhodes removal POD#1.  We will have him work with PT tomorrow and continue on antibiotics overnight.  Likely d/c to home tomorrow pending pain and drain cares.    Please page with any questions or concerns.  Thank you!    Broderick Daugherty MD  Orthopaedic Surgery PGY-1  #: [phone]      "

## 2018-03-07 NOTE — IP AVS SNAPSHOT
[facility] [address]    [address]    [place] [address]    Phone:  [phone]                                       After Visit Summary   3/7/2018    Edwin Ellison    MRN: [id number]           After Visit Summary Signature Page     I have received my discharge instructions, and my questions have been answered. I have discussed any challenges I see with this plan with the nurse or doctor.    ..........................................................................................................................................  Patient/Patient Representative Signature      ..........................................................................................................................................  Patient Representative Print Name and Relationship to Patient    ..................................................               ................................................  Date                                            Time    ..........................................................................................................................................  Reviewed by Signature/Title    ...................................................              ..............................................  Date                                                            Time

## 2018-03-07 NOTE — OP NOTE
DATE OF SURGERY: 3/7/2018    PREOPERATIVE DIAGNOSIS: Lumbar Stenosis and Neurogenic Claudication             POSTOPERATIVE DIAGNOSIS: * No post-op diagnosis entered *    PROCEDURES:  1. L3, L4 and L5 laminectomy and partial medial facetectomies and foraminotomies for decompression of the L3, L4 and L5 nerve roots.    PRIMARY SURGEON: Leo James MD    FIRST ASSISTANT: Broderick Marmolejo PGY1    ANESTHESIA: General Endotracheal    COMPLICATIONS:  None.    SPECIMENS: None.    ESTIMATED BLOOD LOSS: 50 mL    INDICATIONS:                          Edwin Ellison is a 72 year old male who elected surgical treatment, and understood the indications for this surgery, as well as its risks, benefits, and alternatives as documented in the pre-operative H&P.  Specifically, we reviewed the risks and benefits of the surgery in detail. The risks include, but are not limited to, the general risks associated with anesthesia, including death, pulmonary embolism, DVT, stroke, myocardial infarction, pneumonia, and urinary tract infection. Additional risks specific to the surgery include the risk of infection, dural tear with resultant CSF leak which might necessitate placement of a drain or revision surgery or could result in headaches, nerve injury resulting in weakness or paralysis, risk of adjacent segment disease, the risks of vascular injury, need for revision surgery in the future due to one of the above issues, or risk of incomplete symptom relief. Edwin Ellison understands the risks of the surgery and wishes to proceed.  No Guarantees were given.       DESCRIPTION OF PROCEDURE:           Edwin Ellison was taken to the operating room, where the Anesthesiology Service induced satisfactory general anesthesia.  Ancef was given IV.  Venous thromboembolic prophylaxis was performed with sequential devices.  A Rhodes catheter was placed under standard sterile techniques.  The patient was placed prone on an open OSI frame with the abdomen  hanging free and all bony prominences well padded.  The low back was then prepped and draped in its entirety in the usual sterile fashion.  We then held a multidisciplinary time out in which we verified the patient, procedure, antibiotics, and operative plan.  All team members were in agreement.    The X-ray was brought into the sterile field to obtain a true lateral view and needles were placed to haris the intended point of incision.  We made a midline incision and a bilateral subperiosteal exposure was performed of the L3 through L5 spinous processes and medial lamina.  A needle was placed into the medial facet joint at the caudal most level and a final image was then obtained to verify our position.     The decompression was performed in the same fashion at each level sequentially including the L3, L4 and L5 levels which resulted in the decompression of the L3, L4 and L5 nerve roots.      For each level, the spinous process was removed with a rongeur. The dorsal cortex of the lamina was then thinned with the rongeur.  We palpated the lateral border of the pars to verify the planned width of the decompression.  I used a cautery to haris out the planned limits of the resection to provide a visual reference.  A 4mm round bur was then used to remove the remaining dorsal cortical and cancellous layers.  Eventually, the ligamentum flavum was uncovered by the bur in the midline.  The proximal origin of the ligamentum flavum and epidural fat were identified. A curette was used to split and elevate the flavum in the midline, exposing the epidural fat underneath.  Kerrison punches were then used to resect the flavum down to the caudad laminar edge.  At this point the central decompression was complete, and I turned my attention to the partial medial facetectomy.  A 5mm strait osteotome was used to resect the remaining overhanging medial facet.  Where necessary the resection was completed with a kerrison.  This was continued  laterally until the medial wall of the pedicle was readily palpable.  I then completed the foraminotomies.  A santana was used to trace out the edge of the pedicle.  I then introduced a 2mm kerrison into the foramen below the pedicle, keeping the shoe of the kerrison facing the nerve root.  Using multiple bites in this way, I was able to remove the remaining facet capsule and osteophytes that were compressing the exiting nerve root.  In the same fashion, I was able to reach out into the foramen above the pedicle and remove any compression on the exiting nerve root above. This process was performed sequentially at each of the levels noted.       In performing the decompression, I found that there was a severe amount of ligamentum flavum hypertrophy and impingement, particularly along the patient's right side.    The wound was then thoroughly irrigated.  Hemostasis was achieved.  A MEME drain was placed.  The wound was closed in layers with vicryl suture, followed by monocryl and dermabond for the skin.  A sterile dressing was applied. The patient was turned supine, extubated, and returned to the recovery area in stable condition.      I was present and scrubbed for the entire procedure.    Leo James MD

## 2018-03-08 ENCOUNTER — APPOINTMENT (OUTPATIENT)
Dept: PHYSICAL THERAPY | Facility: CLINIC | Age: 73
DRG: 517 | End: 2018-03-08
Attending: ORTHOPAEDIC SURGERY
Payer: COMMERCIAL

## 2018-03-08 LAB — GLUCOSE BLDC GLUCOMTR-MCNC: 153 MG/DL (ref 70–99)

## 2018-03-08 PROCEDURE — 25000132 ZZH RX MED GY IP 250 OP 250 PS 637: Performed by: ORTHOPAEDIC SURGERY

## 2018-03-08 PROCEDURE — 25000132 ZZH RX MED GY IP 250 OP 250 PS 637: Performed by: STUDENT IN AN ORGANIZED HEALTH CARE EDUCATION/TRAINING PROGRAM

## 2018-03-08 PROCEDURE — G0378 HOSPITAL OBSERVATION PER HR: HCPCS

## 2018-03-08 PROCEDURE — 40000193 ZZH STATISTIC PT WARD VISIT: Performed by: PHYSICAL THERAPIST

## 2018-03-08 PROCEDURE — 97110 THERAPEUTIC EXERCISES: CPT | Mod: GP | Performed by: PHYSICAL THERAPIST

## 2018-03-08 PROCEDURE — 00000146 ZZHCL STATISTIC GLUCOSE BY METER IP

## 2018-03-08 PROCEDURE — 97530 THERAPEUTIC ACTIVITIES: CPT | Mod: GP | Performed by: PHYSICAL THERAPIST

## 2018-03-08 PROCEDURE — 97116 GAIT TRAINING THERAPY: CPT | Mod: GP | Performed by: PHYSICAL THERAPIST

## 2018-03-08 PROCEDURE — 97161 PT EVAL LOW COMPLEX 20 MIN: CPT | Mod: GP | Performed by: PHYSICAL THERAPIST

## 2018-03-08 PROCEDURE — 25000128 H RX IP 250 OP 636: Performed by: ORTHOPAEDIC SURGERY

## 2018-03-08 RX ADMIN — OXYCODONE HYDROCHLORIDE 5 MG: 5 TABLET ORAL at 00:24

## 2018-03-08 RX ADMIN — ATORVASTATIN CALCIUM 40 MG: 40 TABLET, FILM COATED ORAL at 21:12

## 2018-03-08 RX ADMIN — Medication 1000 MG: at 08:31

## 2018-03-08 RX ADMIN — CEFAZOLIN SODIUM 2 G: 2 INJECTION, SOLUTION INTRAVENOUS at 13:21

## 2018-03-08 RX ADMIN — SENNOSIDES AND DOCUSATE SODIUM 2 TABLET: 8.6; 5 TABLET ORAL at 08:04

## 2018-03-08 RX ADMIN — OXYCODONE HYDROCHLORIDE 10 MG: 5 TABLET ORAL at 17:27

## 2018-03-08 RX ADMIN — Medication 1000 MG: at 19:41

## 2018-03-08 RX ADMIN — RANITIDINE 150 MG: 150 TABLET ORAL at 08:03

## 2018-03-08 RX ADMIN — SENNOSIDES AND DOCUSATE SODIUM 2 TABLET: 8.6; 5 TABLET ORAL at 19:42

## 2018-03-08 RX ADMIN — CEFAZOLIN SODIUM 2 G: 2 INJECTION, SOLUTION INTRAVENOUS at 04:56

## 2018-03-08 RX ADMIN — OXYCODONE HYDROCHLORIDE 10 MG: 5 TABLET ORAL at 11:17

## 2018-03-08 RX ADMIN — ACETAMINOPHEN 650 MG: 325 TABLET, FILM COATED ORAL at 11:17

## 2018-03-08 RX ADMIN — OXYCODONE HYDROCHLORIDE 10 MG: 5 TABLET ORAL at 14:32

## 2018-03-08 RX ADMIN — OXYCODONE HYDROCHLORIDE 10 MG: 5 TABLET ORAL at 03:37

## 2018-03-08 RX ADMIN — HYDROCHLOROTHIAZIDE 25 MG: 25 TABLET ORAL at 08:31

## 2018-03-08 RX ADMIN — LISINOPRIL 20 MG: 10 TABLET ORAL at 08:30

## 2018-03-08 RX ADMIN — OXYCODONE HYDROCHLORIDE 10 MG: 5 TABLET ORAL at 08:03

## 2018-03-08 ASSESSMENT — ACTIVITIES OF DAILY LIVING (ADL)
TRANSFERRING: 0-->INDEPENDENT
BATHING: 0-->INDEPENDENT
FALL_HISTORY_WITHIN_LAST_SIX_MONTHS: NO
COGNITION: 0 - NO COGNITION ISSUES REPORTED
RETIRED_COMMUNICATION: 0-->UNDERSTANDS/COMMUNICATES WITHOUT DIFFICULTY
AMBULATION: 0-->INDEPENDENT
DRESS: 0-->INDEPENDENT
SWALLOWING: 0-->SWALLOWS FOODS/LIQUIDS WITHOUT DIFFICULTY
RETIRED_EATING: 0-->INDEPENDENT
TOILETING: 0-->INDEPENDENT

## 2018-03-08 ASSESSMENT — PAIN DESCRIPTION - DESCRIPTORS
DESCRIPTORS: ACHING;DISCOMFORT
DESCRIPTORS: ACHING

## 2018-03-08 NOTE — PLAN OF CARE
Problem: Patient Care Overview  Goal: Plan of Care/Patient Progress Review  Outcome: Improving  Focus: Physio  D: Doing well. Pain controlled on oxycodone 10mg. Dressing intact along with MEME bulb. Pulled 30cc from 6-10am. Up with assist of one and walker. Passed therapy. Rhodes is out. Voided times one with pvr 66. P: Continue plan of care.     13:30 Voided three times. Last pvr 66. IV saline locked.

## 2018-03-08 NOTE — PLAN OF CARE
Problem: Patient Care Overview  Goal: Plan of Care/Patient Progress Review  Discharge Planner PT   Patient plan for discharge: Home with assist  Current status: Pt demonstrated supine to/from sitting x 2 with HOB elevated and SBA to mod I.  Sit to/from standing from varying surface heights with FWW and SBA to mod I.  Pt ambulated 200' and 50' with FWW and SBA to mod I, 1 seated rest break.  Pt ascended/descended 3 stairs x 2 with 1 railing and CGA to SBA x 1.  Pt educated on home walking program and LE strengthening HEP.  Pt was educated on dressing techniques using a reacher and demonstrated understanding.  Pt educated on car transfer and verbalized understanding of car transfer.  Pt educated to use walker at home and when to transition to SEC.    Barriers to return to prior living situation: No barriers to discharge home.   Recommendations for discharge: Home with assist.   Rationale for recommendations: Continue HEP     Physical Therapy Discharge Summary    Reason for therapy discharge:    Discharged to home.    Progress towards therapy goal(s). See goals on Care Plan in [facility] electronic health record for goal details.  Goals met    Therapy recommendation(s):    Continue home exercise program.           Entered by: Mari Alba 03/08/2018 10:16 AM

## 2018-03-08 NOTE — PLAN OF CARE
Problem: Patient Care Overview  Goal: Plan of Care/Patient Progress Review  Outcome: Improving  Pt arrived on unit from PACU at 18;00  Alert and oriented x4.  Denies nausea, CP or SOB  Lungs clear and BS active. LBM today morning.  Dressing to back CDI, CMS / neuros intact.  No Rt foot drop or numbness any more.  Pt is on outpatient status.  Brochure given and outpatient goals reviewed with pt.  Tolerates regular diet , no n/v or reflux  Good bed mobility and able to turn with minimal assist.  Pain well control with oxycodone low dose and Tylenol.  Ice applied to back  Good urine output from thrasher, IVF infusing at 75ml/hr,  Ancef 2g hung for scheduled.  MEME output 15ml/shift,   Monitored pt with goals every 2hrs. Will continue to monitor.

## 2018-03-08 NOTE — PROGRESS NOTES
"  SPIRITUAL HEALTH SERVICES  [facility] ([facility]) 10A   ON-CALL VISIT    REFERRAL SOURCE: [facility]    I visited with Edwin on the basis of a hospital  request. Edwin shared that he just had a back surgery and was feeling \"pretty darn good\" except that he needs to stay one more night since things are not draining as they should. Edwin talked about his family, including five grandchildren, the loss of his granddaughter's partner to influenza this January, his other granddaughter's gymnastics career, and the transitions around the younger grandkids \"becoming teenagers\" and distancing themselves from family. He lamented the [place] winter with great humor and talked about desires to move \"far away\" where it's warm (but his wife doesn't want to go). 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 Edwin's discussion of his family and support network indicate he has strong support, good emotional resources, and a lot of insight into his needs and hopes for his life. Edwin said he was \"grateful\" for talking because \"the nurses are great but it's pretty quiet up here.\"    PLAN: I let him know that spiritual health remains available while he remains on the unit.                                                                                                                                                Clarice Munson  Staff   Pager [phone]    "

## 2018-03-08 NOTE — PROGRESS NOTES
Orthopaedic Surgery Progress Note 03/08/2018    E: No acute events overnight.    S: Pain well controlled. Has not ambulated yet.  States his numbness in his right leg is improved from pre-operatively.  Frustrated a capnography machine, questions regarding possible need for outpatient sleep study.  No CP/SOB. Plan of care reviewed and questions answered    O:  Temp: 96.6  F (35.9  C) Temp src: Oral BP: 109/55 Pulse: 57 Heart Rate: 56 Resp: 15 SpO2: 93 % O2 Device: None (Room air) Oxygen Delivery: 6 LPM    Exam:  Gen: No acute distress, resting comfortably in bed.  Resp: Non-labored breathing    MSK: Inspection: Dressing C/D/I, no gross deformities of BLE  Motor:  Tibialis anterior, gastroc/soleus, EHL strength 5/5   Sensory: SILT to superficial peroneal, deep peroneal, saphenous, sural, and tibial nerve territories  Circulation: palpable DP and TP, foot warm and well perfused      Drain output: 65 mL out total since OR, 40 since [place]      Assessment: Edwin Ellison is a 72 year old male s/p L3-5 decompression on 3/7 with Dr. James. Doing well.    - PT for assessment this AM  -Drain output will be evaluated this morning-if stable to decreasing, we will pull this afternoon prior to discharge.  If drain output continues to be moderate, we will wait 1 more day prior to removal.  - If passes PT, pain well controlled, and drain is able to be removed this afternoon, will discharge home today.    Plan:  Admit to Ortho outpatient in bed  Activity: Up with assist.  Weight bearing status: WBAT  Antibiotics: Ancef x 24 hours.  Diet: Begin with clear fluids and progress diet as tolerated.  DVT prophylaxis: Mechanical only, hold aspirin for 5 days  Bracing/Splinting: None  Wound Care: Aquacel to remain in place until POD#7  Drains: Document output per shift, discontinue when <40 cc/shift.  Pain management: transition from IV to orals as tolerated.   X-rays: None needed  Physical Therapy: ROM, ADL's.  Occupational Therapy: ADL's.  Labs:  None necessary without complication  Consults: PT. appreciate assistance in caring for this patient.  Follow-up: Clinic with Dr. James in 6 weeks (on or around 4/21).     Disposition: Pending progress with therapies, pain control on orals, and medical stability, anticipate discharge to home on POD #1-2..      Broderick Daugherty MD 03/08/2018  Orthopaedic Surgery Resident, PGY-1  Pager: [phone]    For questions about this patient, please attempt to contact me at my pager prior to contacting the orthopaedics resident on call. Thank you!

## 2018-03-08 NOTE — PROGRESS NOTES
03/08/18 1002   Quick Adds   Type of Visit Initial PT Evaluation       Present no   Language English   Living Environment   Lives With spouse   Living Arrangements house   Home Accessibility stairs (1 railing present);stairs to enter home;stairs within home   Number of Stairs to Enter Home 2   Number of Stairs Within Home (flight to basement but all needs met on one level)   Stair Railings at Home outside, present on left side;inside, present on right side   Transportation Available family or friend will provide;car   Living Environment Comment Pt reported having 2 steps to get into his home and then everything is on one level.  Does have stairs to basement but does not have to go down there initially.    Self-Care   Usual Activity Tolerance good   Current Activity Tolerance moderate   Regular Exercise no   Equipment Currently Used at Home none   Activity/Exercise/Self-Care Comment Pt was independent with all ADLs at baseline.    Functional Level Prior   Ambulation 0-->independent   Transferring 0-->independent   Toileting 0-->independent   Bathing 0-->independent   Dressing 0-->independent   Eating 0-->independent   Communication 0-->understands/communicates without difficulty   Swallowing 0-->swallows foods/liquids without difficulty   Cognition 0 - no cognition issues reported   Fall history within last six months no   Which of the above functional risks had a recent onset or change? ambulation;transferring   Prior Functional Level Comment Pt reported being independent with all functional mobility at baseline, walking limited due to LE weakenss.    General Information   Onset of Illness/Injury or Date of Surgery - Date 03/07/18   Referring Physician Jacob Bailey MD   Patient/Family Goals Statement Goal not verbalized.    Pertinent History of Current Problem (include personal factors and/or comorbidities that impact the POC) Pt is a 71 y/o male s/p L3, L4 and L5 laminectomy and partial  medial facetectomies and foraminotomies for decompression of the L3, L4 and L5 nerve roots.  See chart for PMH   Precautions/Limitations fall precautions;spinal precautions   Weight-Bearing Status - LUE full weight-bearing   Weight-Bearing Status - RUE full weight-bearing   Weight-Bearing Status - LLE weight-bearing as tolerated   Weight-Bearing Status - RLE weight-bearing as tolerated   Heart Disease Risk Factors High blood pressure;Lack of physical activity   General Observations Pt supine in bed at start of PT session.     General Info Comments Pt currently has MEME drain in place.    Cognitive Status Examination   Orientation other (see comments)  (Not tested)   Level of Consciousness alert   Follows Commands and Answers Questions 100% of the time;able to follow multistep instructions   Personal Safety and Judgment intact   Memory intact   Pain Assessment   Patient Currently in Pain Yes, see Vital Sign flowsheet   Integumentary/Edema   Integumentary/Edema other (describe)   Integumentary/Edema Comments Incision not observed due to post-op dressing in place.    Posture    Posture Forward head position;Protracted shoulders   Range of Motion (ROM)   ROM Comment Pt demosntrated decreased left LE ROM, but functional right LE ROM.    Strength   Strength Comments LE strength not formally tested but demonstrated functional strength for bed mobiltiy, transfers, and gait.    Bed Mobility   Bed Mobility Comments Pt demonstrated log rolling technique for supine to/from sitting with SBA x 1.     Transfer Skills   Transfer Comments Sit to/from standing with FWW and SBA x 1.    Gait   Gait Comments Pt ambulated 200' with FWW and SBA x 1.    Balance   Balance Comments Pt demonstrated good sitting balance and good standing balance with FWW.  Pt unsteady when attempting to take a couple of steps without FWW.    Sensory Examination   Sensory Perception Comments Pt did not c/o numbness/tingling, but reported right LE felt different  "than left LE.     General Therapy Interventions   Planned Therapy Interventions bed mobility training;gait training;strengthening;transfer training;home program guidelines;ADL retraining   Intervention Comments Bed mobility, transfers, gait, dressing, and LE strengthening.     Clinical Impression   Criteria for Skilled Therapeutic Intervention yes, treatment indicated   PT Diagnosis Decreased ROM, decreased strength, and impaired functional mobility/ADLs.    Influenced by the following impairments Post-op pain, weakness, and decreased ROM.    Functional limitations due to impairments Impaired bed mobility, transfers, and gait.    Clinical Presentation Stable/Uncomplicated   Clinical Presentation Rationale Pt is a 73 y/o male s/p L3, L4 and L5 laminectomy and partial medial facetectomies and foraminotomies for decompression of the L3, L4 and L5 nerve roots.  Pt PMH/comorbidities do not impact PT POC.    Clinical Decision Making (Complexity) Low complexity   Therapy Frequency` (One time eval and treat)   Predicted Duration of Therapy Intervention (days/wks) 1 day   Anticipated Equipment Needs at Discharge other (see comments)  (Pt has FWW, reacher, and cane at home)   Anticipated Discharge Disposition Home with Assist   Risk & Benefits of therapy have been explained Yes   Patient, Family & other staff in agreement with plan of care Yes   [facility] [employer] TM \"6 Clicks\"   2016, Trustees of [facility], under license to [employer].  All rights reserved.   6 Clicks Short Forms Basic Mobility Inpatient Short Form   [facility] [employer]  \"6 Clicks\" V.2 Basic Mobility Inpatient Short Form   1. Turning from your back to your side while in a flat bed without using bedrails? 4 - None   2. Moving from lying on your back to sitting on the side of a flat bed without using bedrails? 4 - None   3. Moving to and from a bed to a chair (including a wheelchair)? 4 - None   4. Standing up from a chair using your arms " (e.g., wheelchair, or bedside chair)? 4 - None   5. To walk in hospital room? 4 - None   6. Climbing 3-5 steps with a railing? 3 - A Little   Basic Mobility Raw Score (Score out of 24.Lower scores equate to lower levels of function) 23   Total Evaluation Time   Total Evaluation Time (Minutes) 10

## 2018-03-09 VITALS
WEIGHT: 205.91 LBS | DIASTOLIC BLOOD PRESSURE: 58 MMHG | HEIGHT: 70 IN | HEART RATE: 57 BPM | SYSTOLIC BLOOD PRESSURE: 109 MMHG | RESPIRATION RATE: 16 BRPM | BODY MASS INDEX: 29.48 KG/M2 | OXYGEN SATURATION: 98 % | TEMPERATURE: 96 F

## 2018-03-09 LAB — GLUCOSE BLDC GLUCOMTR-MCNC: 97 MG/DL (ref 70–99)

## 2018-03-09 PROCEDURE — G0378 HOSPITAL OBSERVATION PER HR: HCPCS

## 2018-03-09 PROCEDURE — 25000132 ZZH RX MED GY IP 250 OP 250 PS 637: Performed by: ORTHOPAEDIC SURGERY

## 2018-03-09 PROCEDURE — 00000146 ZZHCL STATISTIC GLUCOSE BY METER IP

## 2018-03-09 PROCEDURE — 25000128 H RX IP 250 OP 636: Performed by: ORTHOPAEDIC SURGERY

## 2018-03-09 PROCEDURE — 12000001 ZZH R&B MED SURG/OB UMMC

## 2018-03-09 PROCEDURE — 25000132 ZZH RX MED GY IP 250 OP 250 PS 637: Performed by: STUDENT IN AN ORGANIZED HEALTH CARE EDUCATION/TRAINING PROGRAM

## 2018-03-09 RX ORDER — BISACODYL 10 MG
10 SUPPOSITORY, RECTAL RECTAL DAILY PRN
Qty: 2 SUPPOSITORY | Refills: 0 | Status: SHIPPED | OUTPATIENT
Start: 2018-03-09 | End: 2018-04-20

## 2018-03-09 RX ORDER — SODIUM CHLORIDE 9 MG/ML
INJECTION, SOLUTION INTRAVENOUS
Status: DISCONTINUED
Start: 2018-03-09 | End: 2018-03-09 | Stop reason: HOSPADM

## 2018-03-09 RX ORDER — AMOXICILLIN 250 MG
1-2 CAPSULE ORAL 2 TIMES DAILY
Qty: 100 TABLET | COMMUNITY
Start: 2018-03-09 | End: 2018-04-20

## 2018-03-09 RX ORDER — CEFAZOLIN SODIUM 2 G/100ML
2 INJECTION, SOLUTION INTRAVENOUS EVERY 8 HOURS
Status: DISCONTINUED | OUTPATIENT
Start: 2018-03-09 | End: 2018-03-09 | Stop reason: HOSPADM

## 2018-03-09 RX ORDER — ACETAMINOPHEN 325 MG/1
650 TABLET ORAL EVERY 6 HOURS PRN
Qty: 100 TABLET | Refills: 0 | Status: SHIPPED | OUTPATIENT
Start: 2018-03-09

## 2018-03-09 RX ORDER — OXYCODONE HYDROCHLORIDE 5 MG/1
5-10 TABLET ORAL EVERY 4 HOURS PRN
Qty: 60 TABLET | Refills: 0 | Status: SHIPPED | OUTPATIENT
Start: 2018-03-09 | End: 2018-04-20

## 2018-03-09 RX ADMIN — OXYCODONE HYDROCHLORIDE 10 MG: 5 TABLET ORAL at 00:00

## 2018-03-09 RX ADMIN — SENNOSIDES AND DOCUSATE SODIUM 2 TABLET: 8.6; 5 TABLET ORAL at 09:08

## 2018-03-09 RX ADMIN — HYDROCHLOROTHIAZIDE 25 MG: 25 TABLET ORAL at 09:09

## 2018-03-09 RX ADMIN — ACETAMINOPHEN 650 MG: 325 TABLET, FILM COATED ORAL at 17:40

## 2018-03-09 RX ADMIN — OXYCODONE HYDROCHLORIDE 10 MG: 5 TABLET ORAL at 12:32

## 2018-03-09 RX ADMIN — CEFAZOLIN SODIUM 2 G: 2 INJECTION, SOLUTION INTRAVENOUS at 11:40

## 2018-03-09 RX ADMIN — Medication 1000 MG: at 09:09

## 2018-03-09 RX ADMIN — OXYCODONE HYDROCHLORIDE 10 MG: 5 TABLET ORAL at 16:19

## 2018-03-09 RX ADMIN — OXYCODONE HYDROCHLORIDE 10 MG: 5 TABLET ORAL at 06:01

## 2018-03-09 RX ADMIN — OXYCODONE HYDROCHLORIDE 5 MG: 5 TABLET ORAL at 03:09

## 2018-03-09 RX ADMIN — ACETAMINOPHEN 650 MG: 325 TABLET, FILM COATED ORAL at 09:08

## 2018-03-09 RX ADMIN — OXYCODONE HYDROCHLORIDE 10 MG: 5 TABLET ORAL at 09:08

## 2018-03-09 RX ADMIN — RANITIDINE 150 MG: 150 TABLET ORAL at 09:09

## 2018-03-09 ASSESSMENT — PAIN DESCRIPTION - DESCRIPTORS: DESCRIPTORS: ACHING;DISCOMFORT

## 2018-03-09 ASSESSMENT — ACTIVITIES OF DAILY LIVING (ADL)
ADLS_ACUITY_SCORE: 9
ADLS_ACUITY_SCORE: 9

## 2018-03-09 NOTE — PLAN OF CARE
Problem: Patient Care Overview  Goal: Plan of Care/Patient Progress Review  Outcome: Improving  Pt up ambulating in halls with walker. Gait steady. Pt c/o back pain. Aqua cell dressing CDI. J.P. draining 10 cc and 20 cc. Dr. James on unit and updated. Pt denies numbness and tingling. Pedals+. Medicated with oxycodone 10 mg.  Tolerating regular diet without nausea. Abd soft. Passing flatus. No BM.     1845 Pt placed on Outpatient observation. Outpatient observation information sheet given to patient. Questions answered.

## 2018-03-09 NOTE — PLAN OF CARE
Problem: Surgery Nonspecified (Adult)  Goal: Signs and Symptoms of Listed Potential Problems Will be Absent, Minimized or Managed (Surgery Nonspecified)  Signs and symptoms of listed potential problems will be absent, minimized or managed by discharge/transition of care (reference Surgery Nonspecified (Adult) CPG).   outpatient goals:  -Vital signs at baseline.   -Able to tolerate oral intake.   -Nurse to Notify Provider when Outpatient in a Bed discharge goals have been met and patient is ready for discharge.       Outcome: No Change  Goals:  1. Adequate pain control using oral analgesics.   2. Tolerates oral intake.   3. Cleared for discharge per provider    0000: 1. Pain managed on oral meds.  2. Tolerating oral intake  0200:1. Pain managed with PO meds   2. Tolerating oral intake  0400: 1. Pain managed with oral pain meds 2. Tolerating oral intake  0600: 1. Pain managed with PO meds   2. Tolerating oral intake      VSS.  Pain being managed with PO oxy.  Pt up ind.  MEME intact and draining.  Pt voiding well.  Reports last BM 3/7, is passing gas.  Dressings CDI.  Pt denies CP, SOB, N/V, numbness or tingling.  Plan to D/C today.  Pt making needs know, call light within reach.  Will continue to monitor.

## 2018-03-09 NOTE — PROGRESS NOTES
Acupuncture Clinical Internship Intake and Treatment Documentation   [facility]    Date:  3/9/2018  Patient Name:  Edwin Ellison   YOB: 1945     Repeat Patient:  no  Has patient had acupoint/acupressure treatment before:  no    Signed consent placed in the medical record:  yes  Patient/Family verbalizes understanding of risks and benefits:  yes  Required information provided to patient:  yes    Diagnosis:  Lumbar Stenosis and Neurogenic Claudication  Status post lumbar spine surgery for decompression of spinal cord  Status post lumbar spine surgery for decompression of spinal cord  +++ OUTSIDE RECORDS SCAN  +++  Status post lumbar spine surgery for decompression of spinal cord    Patient condition and treatment:  Patient had low back surgery  Reason for Intervention Today/Chief Complaint:  Low back pain    Isolation:  No  Type:  None    PRE-SCORE:  moderate    Other Western medical information:  Also had a knee replacement    Medications    Current Facility-Administered Medications:      ceFAZolin (ANCEF) intermittent infusion 2 g in 100 mL dextrose PRE-MIX, 2 g, Intravenous, Q8H, Leo James MD, 2 g at 03/09/18 1140     sodium chloride 0.9 % infusion, , , ,      atenolol (TENORMIN) half-tab 12.5 mg, 12.5 mg, Oral, Dat GRANGER Kyle, MD     atorvastatin (LIPITOR) tablet 40 mg, 40 mg, Oral, At Bedtime, Broderick Daugherty MD, 40 mg at 03/08/18 2112     fluticasone (FLONASE) 50 MCG/ACT spray 2 spray, 2 spray, Nasal, BID PRN, Broderick Daugherty MD     hydrochlorothiazide (HYDRODIURIL) tablet 25 mg, 25 mg, Oral, Dat GRANGER Kyle, MD, 25 mg at 03/09/18 0909     lisinopril (PRINIVIL/ZESTRIL) tablet 20 mg, 20 mg, Oral, Dat GRANGER Kyle, MD, 20 mg at 03/08/18 0830     fish oil-omega-3 fatty acids capsule 1,000 mg, 1,000 mg, Oral, BID, Broderick Daugherty MD, 1,000 mg at 03/09/18 0909     ranitidine (ZANTAC) tablet 150 mg, 150 mg, Oral, Daily,  Broderick Daugherty MD, 150 mg at 03/09/18 0909     lidocaine 1 % 1 mL, 1 mL, Other, Q1H PRN, Broderick Daugherty MD     lidocaine (LMX4) kit, , Topical, Q1H PRN, Broderick Daugherty MD     sodium chloride (PF) 0.9% PF flush 3 mL, 3 mL, Intracatheter, Q1H PRN, Broderick Daugherty MD, 1,000 mL at 03/07/18 2107     sodium chloride (PF) 0.9% PF flush 3 mL, 3 mL, Intracatheter, Q8H, Broderick Daugherty MD, 3 mL at 03/09/18 1146     naloxone (NARCAN) injection 0.1-0.4 mg, 0.1-0.4 mg, Intravenous, Q2 Min PRN, Broderick Daugherty MD     sodium chloride 0.9% infusion, , Intravenous, Continuous, Broderick Daugherty MD, Last Rate: 75 mL/hr at 03/08/18 0456     HYDROmorphone (DILAUDID) injection 0.2 mg, 0.2 mg, Intravenous, Q2H PRN, Broderick Daugherty MD     oxyCODONE IR (ROXICODONE) tablet 5-10 mg, 5-10 mg, Oral, Q3H PRN, Broderick Daugherty MD, 10 mg at 03/09/18 1232     acetaminophen (TYLENOL) tablet 650 mg, 650 mg, Oral, Q6H PRN, Broderick Daugherty MD, 650 mg at 03/09/18 0908     ondansetron (ZOFRAN-ODT) ODT tab 4 mg, 4 mg, Oral, Q6H PRN **OR** ondansetron (ZOFRAN) injection 4 mg, 4 mg, Intravenous, Q6H PRN, Broderick Daugherty MD     prochlorperazine (COMPAZINE) injection 5 mg, 5 mg, Intravenous, Q6H PRN **OR** prochlorperazine (COMPAZINE) tablet 5 mg, 5 mg, Oral, Q6H PRN, Broderick Daugherty MD     metoclopramide (REGLAN) tablet 5 mg, 5 mg, Oral, Q6H PRN **OR** metoclopramide (REGLAN) injection 5 mg, 5 mg, Intravenous, Q6H PRN, Broderick Daugherty MD     senna-docusate (SENOKOT-S;PERICOLACE) 8.6-50 MG per tablet 1-2 tablet, 1-2 tablet, Oral, BID, Leo James MD, 2 tablet at 03/09/18 0908    Pre-Treatment Assessment  Chief Complaint/ Reason for Intervention Today:  Low back pain  Chief Complaint Pre-Score:  moderate   Describe:  Low back pain that is dull and achy when resting, can be sharp and stabbing when moving around in the wrong way. Ice helps, pain medications, mild movements help.  Pain Location:  Low back  L3-L5  Pre Session Pain:  Moderate  Pre Session Anxiety:  Mild  Pre Session Nausea:  None    10 Traditional Chinese Medicine Assessment Questions  - Cold/ Heat:  Runs warmer  - Sweat:  none  - Headaches/Body aches:  Needs another knee replacement and one of his hips  - Chest/Abdomen:  Coughing up a little bit of yellowish phlegm  - Digestion:  Good appetite, gas  - Bowel Movement/Urination:  No BM since Wednesday morning, Urinating every 2-3 hours.  - Hearing/Vision:  Has hearing aids and glasses  - Sleep (prior to hospital):  Difficulty staying asleep, wakes up every night at 2am. 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     Traditional Chinese Medicine Treatment  - ACUPUNCTURE:  L) SI 3, TB 6 R) GB 34, UB 62 auricular: carlson men, lumbar  - NEEDLE COUNT: In: 8   Out: 8    Time In: 2:30pm     Magnet informed consent signed and given:  no    Post Treatment Assessment  Chief complaint post score:  better  Post Session Observation:  Patient seems more relaxed  Patient/Family Education:  provided  Verbal information provided:  yes  Written information provided:  no  All questions answered at time of treatment:  yes    Treatment/Procedure(s) performed by:  Mi Cardona    Date: 3/9/2018     [facility]  Supervising acupuncturist:  [facility]ad [facility] [place] Acupuncture license #: [id number]  P: [phone]

## 2018-03-09 NOTE — PLAN OF CARE
Problem: Patient Care Overview  Goal: Plan of Care/Patient Progress Review  Outcome: No Change  D: this staff PICKED UP last 4 hrs. NO CHANGE from previous nurses assessment  ( see that nurses note)  A: con't to chloéior.

## 2018-03-09 NOTE — PLAN OF CARE
Problem: Patient Care Overview  Goal: Plan of Care/Patient Progress Review  Outcome: Improving    VS:   VSS   Output:   Voiding spontaneously without difficulty.    Activity:   Up independently with walker.    Skin: Lower back incision.    Pain:   Back pain managed with prn oxycodone and tylenol.   Neuro/CMS:   Intact.    Dressing(s):   Back dressing CDI.    Diet:   Tolerating regular diet and fluids.    LDA:   KASIE ANDINO. MEME drain removed in afternoon by ortho provider.   Equipment:   Walker.    Plan:   Discharge later this afternoon.    Additional Info:   N/A.

## 2018-03-09 NOTE — PROGRESS NOTES
Orthopaedic Surgery Progress Note 03/09/2018    E: No acute events overnight.    S: Pain well controlled.  Has been up with physical therapy in past.  States his numbness in his right leg is 95% improved from pre-operatively. No CP/SOB.  Hopeful that his drain slows down and he is able to go home today.  Plan of care reviewed and questions answered    O:  Temp: 96  F (35.6  C) Temp src: Oral BP: 108/71   Heart Rate: 69 Resp: 16 SpO2: 98 % O2 Device: None (Room air)      Exam:  Gen: No acute distress, resting comfortably in bed.  Resp: Non-labored breathing    MSK: Inspection: Dressing C/D/I, no gross deformities of BLE  Motor:  Tibialis anterior, gastroc/soleus, EHL strength 5/5   Sensory: SILT to superficial peroneal, deep peroneal, saphenous, sural, and tibial nerve territories  Circulation: palpable DP and TP, foot warm and well perfused      Drain output: 60 out since MN      Assessment: Edwin Ellison is a 72 year old male s/p L3-5 decompression on 3/7 with Dr. James. Doing well.    - Passed PT  -Drain output will be evaluated this morning-if stable to decreasing, we will pull this afternoon prior to discharge.  If drain output continues to be moderate, we will wait 1 more day prior to removal.  - We will continue Ancef while the drain is in place.    Plan:  Admit to Ortho, now inpatient as he has stayed two midnights.  Activity: Up with assist.  Weight bearing status: WBAT  Antibiotics: Ancef x 24 hours.  Diet: Begin with clear fluids and progress diet as tolerated.  DVT prophylaxis: Mechanical only, hold aspirin for 5 days  Bracing/Splinting: None  Wound Care: Aquacel to remain in place until POD#7  Drains: Document output per shift, discontinue when <40 cc/shift.  Pain management: transition from IV to orals as tolerated.   X-rays: None needed  Physical Therapy: ROM, ADL's.  Occupational Therapy: ADL's.  Labs: None necessary without complication  Consults: PT. appreciate assistance in caring for this  patient.  Follow-up: Clinic with Dr. James in 6 weeks (on or around 4/21).     Disposition: Pending progress with therapies, pain control on orals, and medical stability, anticipate discharge to home on POD #1-2..      Broderick Daugherty MD 03/09/2018  Orthopaedic Surgery Resident, PGY-1  Pager: [phone]    For questions about this patient, please attempt to contact me at my pager prior to contacting the orthopaedics resident on call. Thank you!

## 2018-03-10 NOTE — PLAN OF CARE
Problem: Patient Care Overview  Goal: Plan of Care/Patient Progress Review  Outcome: Adequate for Discharge Date Met: 03/09/18  Pt up independently with walker. Ambulating well; gait steady. Pain meds given as needed. PIV removed. Tolerating regular diet. Pt ready to discharge home. Writer reviewed all paperwork with pt. Pt given d/c meds. Pt discharging home with son at 1805.

## 2018-03-10 NOTE — DISCHARGE SUMMARY
Orthopaedic Surgery Discharge Summary          Name:  Edwin Ellison  MRN:  [id number]  YOB: 1945      Date of Admission:  3/7/2018  Date of Discharge::  3/9/2018  Discharge Physician:  Dr. Daugherty, Dr. James               Admission Diagnoses:   Lumbar Stenosis and Neurogenic Claudication          Discharge Diagnosis:   S/P lumbar spine surgery for decompression of the spinal cord          Procedures:   Surgery: L3-5 laminectomies, partial medial facetectomies, and foraminotomies  Date: 3/7/18          Discharge Medications:     Discharge Medication List as of 3/9/2018  4:04 PM      START taking these medications    Details   oxyCODONE IR (ROXICODONE) 5 MG tablet Take 1-2 tablets (5-10 mg) by mouth every 4 hours as needed for other (pain control or improvement in physical function. Hold dose for analgesic side effects.), Disp-60 tablet, R-0, Local Print      acetaminophen (TYLENOL) 325 MG tablet Take 2 tablets (650 mg) by mouth every 6 hours as needed for mild pain, Disp-100 tablet, R-0, E-Prescribe      bisacodyl (DULCOLAX) 10 MG Suppository Place 1 suppository (10 mg) rectally daily as needed for constipation, Disp-2 suppository, R-0, E-Prescribe      senna-docusate (SENOKOT-S;PERICOLACE) 8.6-50 MG per tablet Take 1-2 tablets by mouth 2 times daily, Disp-100 tablet, HistoricalContinue to use stool softeners twice daily. Hold for loose stools         CONTINUE these medications which have NOT CHANGED    Details   ATENOLOL PO Take 12.5 mg by mouth every morning , Historical      HYDROCHLOROTHIAZIDE PO Take 25 mg by mouth every morning , Historical      LISINOPRIL PO Take 20 mg by mouth every morning , Historical      ATORVASTATIN CALCIUM PO Take 40 mg by mouth At Bedtime , Historical      FLUTICASONE FUROATE NA [place] in nostril 2 times daily, Historical      ranitidine (ZANTAC) 150 MG tablet Take 1 tablet (150 mg) by mouth daily, Disp-60 tablet, Historical      ASPIRIN PO Take 325 mg by mouth At  Bedtime , Historical      Omega-3 Fatty Acids (FISH OIL) 1000 MG CPDR Take 1,000 mg by mouth 2 times daily , Historical                   Consultations:   Physical Therapy          Brief History of Illness:   Edwin Ellison is a 72 year old male who elected surgical treatment, and understood the indications for this surgery, as well as its risks, benefits, and alternatives as documented in the pre-operative H&P.  Specifically, we reviewed the risks and benefits of the surgery in detail. The risks include, but are not limited to, the general risks associated with anesthesia, including death, pulmonary embolism, DVT, stroke, myocardial infarction, pneumonia, and urinary tract infection. Additional risks specific to the surgery include the risk of infection, dural tear with resultant CSF leak which might necessitate placement of a drain or revision surgery or could result in headaches, nerve injury resulting in weakness or paralysis, risk of adjacent segment disease, the risks of vascular injury, need for revision surgery in the future due to one of the above issues, or risk of incomplete symptom relief. Edwin Ellison understands the risks of the surgery and wishes to proceed           Hospital Course:   The patient was admitted to the hospital after the above listed procedure.  Hospital course was uneventful.    Patient worked with PT beginning POD#1 and quickly was cleared for home.  His drain continued to have moderate output and was left in place POD#1, and was eventually removed in the afternoon of POD#2.  On POD#2, patient was tolerating a regular diet, voiding on own, had pain well controlled on oral pain medications and was felt to be medically stable for d/c to home.    Exam at time of Discharge:  Dressing C/D/I  Motor:  Fires tibialis anterior, gastroc/soleus. EHL strength 3/5 on the R, 5/5 on the left.  Improved from pre-op where EHL was 2/5.                    Sensory: SILT to superficial peroneal, deep peroneal,  saphenous, sural, and tibial nerve territories  Circulation: palpable DP and TP, foot warm and well perfused    DVT prophylaxis: Mechanical - hold ASA for 5 days            Discharge Instructions and Follow-Up:     Discharge Procedure Orders  No lifting    Order Comments: No lifting over 10 lbs and no strenuous physical activity for 6 weeks     Weight bearing status - As tolerated     No driving or operating machinery    Order Comments: Until you stop taking narcotic medications.     Diet Instructions   Order Comments: Resume pre-procedure diet     Dressing   Order Comments: Keep dressing clean and dry.  Dressing / incisional care as instructed by RN and or Surgeon     Reason for your hospital stay   Order Comments: You had spine surgery     Discharge Instructions   Order Comments: Bathing and Wound Care:  Please leave the dressing in place for 5 days as a minimum. Before the dressing comes off, you should not get it wet and should sponge bathe around the wound.  After 5 days you may take the dressing off and leave it open to air.  Once the dressing is off, you may shower normally and let water gently fall over the wound and then pat it dry.  Do not put any creams or lotions on the wound.  Contact us with any new drainage or worsening redness or irritation of the wound.  We DO want you to be up and walking at least 30 minutes per day to prevent generalized deconditioning.        General Information:  Your wound should remain relatively dry.  If it has a few tiny dots or spots of drainage on it, that is OK, but if there is continued wetness or drainage beyond this please contact the clinic.  Contact us with any redness around the wound or if you have more than 24 consecutive hours of temperature over 101 degrees F.  A single fever that goes away is OK and is nothing to be alarmed about.  Contact us immediately with any new numbness or weakness in the legs or arms.  Call [phone] and ask to speak to Dr James's  nurse or the triage nurse. After hours or on weekends call the hospital  at [phone] and ask to speak to the ortho resident on call.    Avoid strenuous activity. No bending, twisting, lifting anything over 1- lbs.     When to contact your care team   Order Comments: CALL YOUR PHYSICIAN IF:  1.  Your pain begins to worsen and is unable to be controlled with your medications.  2.  Excessive redness or drainage of cloudy or bloody material from the wounds (Clear red tinted fluid and some mild drainage should be expected). Drainage of any kind 5 days after surgery should be reported to the doctor.  3.  You have a temperature elevation greater than 101.5    4.  You have pain, swelling or redness in your calf. You have numbness or weakness in your leg or foot.    During regular business hours call the [facility] at [phone] and ask for the triage nurse. After hours or weekends call the hospital  at [phone] and ask for the ortho resident on call.     Follow Up and recommended labs and tests   Order Comments: Follow up with Dr Jaems in 6 weeks as planned. Call the clinic with any questions regarding this appointment. [phone].     Diet   Order Comments: Follow this diet upon discharge: resume your usual pre operative diet   Order Specific Question Answer Comments   Is discharge order? Yes      Assign Questionnaire Series to Patient                Discharge Disposition:   Discharged to home      Patient was discussed with Dr. James on day of discharge.    Broderick Daugherty MD  Orthopaedic Surgery PGY-1

## 2018-04-06 ASSESSMENT — ENCOUNTER SYMPTOMS
ARTHRALGIAS: 1
STIFFNESS: 1
MUSCLE CRAMPS: 0
BACK PAIN: 0
MUSCLE WEAKNESS: 0
NECK PAIN: 0
JOINT SWELLING: 0
MYALGIAS: 0

## 2018-04-12 DIAGNOSIS — M54.50 LUMBAGO: Primary | ICD-10-CM

## 2018-04-20 ENCOUNTER — OFFICE VISIT (OUTPATIENT)
Dept: ORTHOPEDICS | Facility: CLINIC | Age: 73
End: 2018-04-20
Payer: COMMERCIAL

## 2018-04-20 ENCOUNTER — RADIANT APPOINTMENT (OUTPATIENT)
Dept: GENERAL RADIOLOGY | Facility: CLINIC | Age: 73
End: 2018-04-20
Attending: ORTHOPAEDIC SURGERY
Payer: COMMERCIAL

## 2018-04-20 VITALS — BODY MASS INDEX: 29.46 KG/M2 | WEIGHT: 205.8 LBS | HEIGHT: 70 IN

## 2018-04-20 DIAGNOSIS — M48.062 SPINAL STENOSIS OF LUMBAR REGION WITH NEUROGENIC CLAUDICATION: Primary | ICD-10-CM

## 2018-04-20 DIAGNOSIS — M54.50 LUMBAGO: ICD-10-CM

## 2018-04-20 NOTE — NURSING NOTE
"Reason For Visit:   Chief Complaint   Patient presents with     Surgical Followup     pt states he is still having some numbness and tingling in his right foot but it is not as bad as it was before surgery.       Primary MD: [facility]  Ref. MD:   ?  No  Occupation :None.  Currently working? No.  Work status?  Retired.  Smoker: No  Request smoking cessation information: No    Date of surgery: 03/07/2018  Type of surgery: 1. L3, L4 and L5 laminectomy and partial medial facetectomies and foraminotomies for decompression of the L3, L4 and L5 nerve roots..      Ht 1.778 m (5' 10\")  Wt 93.4 kg (205 lb 12.8 oz)  BMI 29.53 kg/m2    Pain Assessment  Patient Currently in Pain: Yes  0-10 Pain Scale: 4  Primary Pain Location: Foot  Pain Orientation: Right    Oswestry (CHAS) Questionnaire    OSWESTRY DISABILITY INDEX 4/6/2018   Count 10   Sum 10   Oswestry Score (%) 20          Visual Analog Pain Scale  Back Pain Scale 0-10: 0  Right leg pain: 2 (right foot numbness and tingling)  Left leg pain: 0    Promis 10 Assessment    PROMIS 10 4/6/2018   In general, would you say your health is: Good   In general, would you say your quality of life is: Good   In general, how would you rate your physical health? Good   In general, how would you rate your mental health, including your mood and your ability to think? Good   In general, how would you rate your satisfaction with your social activities and relationships? Good   In general, please rate how well you carry out your usual social activities and roles Very good   To what extent are you able to carry out your everyday physical activities such as walking, climbing stairs, carrying groceries, or moving a chair? Moderately   How often have you been bothered by emotional problems such as feeling anxious, depressed or irritable? Sometimes   How would you rate your fatigue on average? Moderate   How would you rate your pain on average?   0 = No " Pain  to  10 = Worst Imaginable Pain 1   In general, would you say your health is: 3   In general, would you say your quality of life is: 3   In general, how would you rate your physical health? 3   In general, how would you rate your mental health, including your mood and your ability to think? 3   In general, how would you rate your satisfaction with your social activities and relationships? 3   In general, please rate how well you carry out your usual social activities and roles. (This includes activities at home, at work and in your community, and responsibilities as a parent, child, spouse, employee, friend, etc.) 4   To what extent are you able to carry out your everyday physical activities such as walking, climbing stairs, carrying groceries, or moving a chair? 3   In the past 7 days, how often have you been bothered by emotional problems such as feeling anxious, depressed, or irritable? 3   In the past 7 days, how would you rate your fatigue on average? 3   In the past 7 days, how would you rate your pain on average, where 0 means no pain, and 10 means worst imaginable pain? 1   Global Mental Health Score 12   Global Physical Health Score 13   PROMIS TOTAL - SUBSCORES 25   Some recent data might be hidden                Teo Mederos CMA
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
## 2018-04-20 NOTE — MR AVS SNAPSHOT
After Visit Summary   4/20/2018    Edwin Ellison    MRN: [id number]           Patient Information     Date Of Birth          1945        Visit Information        Provider Department      4/20/2018 2:00 PM Leo James MD [facility]        Today's Diagnoses     Spinal stenosis of lumbar region with neurogenic claudication    -  1       Follow-ups after your visit        Follow-up notes from your care team     Return in about 6 weeks (around 6/1/2018).      Your next 10 appointments already scheduled     Jun 01, 2018 11:00 AM CDT   (Arrive by 10:30 AM)   RETURN SPINE with Leo James MD   [facility] ([facility])    [address] [address] [phone]              Who to contact     Please call your clinic at [phone] to:    Ask questions about your health    Make or cancel appointments    Discuss your medicines    Learn about your test results    Speak to your doctor            Additional Information About Your Visit        [employer] Information     [employer] gives you secure access to your electronic health record. If you see a primary care provider, you can also send messages to your care team and make appointments. If you have questions, please call your primary care clinic.  If you do not have a primary care provider, please call [phone] and they will assist you.      [employer] is an electronic gateway that provides easy, online access to your medical records. With [employer], you can request a clinic appointment, read your test results, renew a prescription or communicate with your care team.     To access your existing account, please contact your [facility] or call [phone] for assistance.        Care EveryWhere ID     This is your Care EveryWhere ID. This could be used by other organizations to access your [facility]  "records  [id number]        Your Vitals Were     Height BMI (Body Mass Index)                1.778 m (5' 10\") 29.53 kg/m2           Blood Pressure from Last 3 Encounters:   03/09/18 109/58   02/13/18 145/75    Weight from Last 3 Encounters:   04/20/18 93.4 kg (205 lb 12.8 oz)   03/07/18 93.4 kg (205 lb 14.6 oz)   02/13/18 94.8 kg (209 lb)              Today, you had the following     No orders found for display         Today's Medication Changes          These changes are accurate as of 4/20/18  2:30 PM.  If you have any questions, ask your nurse or doctor.               Stop taking these medicines if you haven't already. Please contact your care team if you have questions.     bisacodyl 10 MG Suppository   Commonly known as:  DULCOLAX   Stopped by:  Leo James MD           oxyCODONE IR 5 MG tablet   Commonly known as:  ROXICODONE   Stopped by:  Leo James MD           senna-docusate 8.6-50 MG per tablet   Commonly known as:  SENOKOT-S;PERICOLACE   Stopped by:  Leo James MD                    Primary Care Provider Fax #    [facility] [phone]       [facility] [address]        Equal Access to Services     PEYTON VILLASENOR AH: Hadii alistair shay hadasho Soomaali, waaxda luqadaha, qaybta kaalmada adeegyada, celine jorge . So [facility] [phone].    ATENCIÓN: Si habla español, tiene a ma disposición servicios gratuitos de asistencia lingüística. ame al [phone].    We comply with applicable federal civil rights laws and [place] laws. We do not discriminate on the basis of race, color, national origin, age, disability, sex, sexual orientation, or gender identity.            Thank you!     Thank you for choosing [facility]  for your care. Our goal is always to provide you with excellent care. Hearing back from our patients is one way we can continue to improve our services. Please take a " few minutes to complete the written survey that you may receive in the mail after your visit with us. Thank you!             Your Updated Medication List - Protect others around you: Learn how to safely use, store and throw away your medicines at www.disposemymeds.org.          This list is accurate as of 4/20/18  2:30 PM.  Always use your most recent med list.                   Brand Name Dispense Instructions for use Diagnosis    acetaminophen 325 MG tablet    TYLENOL    100 tablet    Take 2 tablets (650 mg) by mouth every 6 hours as needed for mild pain    Status post lumbar spine surgery for decompression of spinal cord       ASPIRIN PO      Take 325 mg by mouth At Bedtime        ATENOLOL PO      Take 12.5 mg by mouth every morning        ATORVASTATIN CALCIUM PO      Take 40 mg by mouth At Bedtime        Fish Oil 1000 MG Cpdr      Take 1,000 mg by mouth 2 times daily        FLUTICASONE FUROATE NA      [place] in nostril 2 times daily        HYDROCHLOROTHIAZIDE PO      Take 25 mg by mouth every morning        LISINOPRIL PO      Take 20 mg by mouth every morning        ranitidine 150 MG tablet    ZANTAC    60 tablet    Take 1 tablet (150 mg) by mouth daily
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
## 2018-06-01 ENCOUNTER — OFFICE VISIT (OUTPATIENT)
Dept: ORTHOPEDICS | Facility: CLINIC | Age: 73
End: 2018-06-01
Payer: COMMERCIAL

## 2018-06-01 ENCOUNTER — RADIANT APPOINTMENT (OUTPATIENT)
Dept: GENERAL RADIOLOGY | Facility: CLINIC | Age: 73
End: 2018-06-01
Attending: ORTHOPAEDIC SURGERY
Payer: COMMERCIAL

## 2018-06-01 VITALS — HEIGHT: 70 IN | WEIGHT: 205 LBS | BODY MASS INDEX: 29.35 KG/M2

## 2018-06-01 DIAGNOSIS — M54.2 CERVICALGIA: Primary | ICD-10-CM

## 2018-06-01 DIAGNOSIS — M54.2 CERVICALGIA: ICD-10-CM

## 2018-06-01 DIAGNOSIS — M48.062 SPINAL STENOSIS OF LUMBAR REGION WITH NEUROGENIC CLAUDICATION: Primary | ICD-10-CM

## 2018-06-01 NOTE — MR AVS SNAPSHOT
"              After Visit Summary   6/1/2018    Edwin Ellison    MRN: [id number]           Patient Information     Date Of Birth          1945        Visit Information        Provider Department      6/1/2018 11:00 AM Leo James MD [facility]        Today's Diagnoses     Spinal stenosis of lumbar region with neurogenic claudication    -  1       Follow-ups after your visit        Who to contact     Please call your clinic at [phone] to:    Ask questions about your health    Make or cancel appointments    Discuss your medicines    Learn about your test results    Speak to your doctor            Additional Information About Your Visit        MyChart Information     [employer] gives you secure access to your electronic health record. If you see a primary care provider, you can also send messages to your care team and make appointments. If you have questions, please call your primary care clinic.  If you do not have a primary care provider, please call [phone] and they will assist you.      [employer] is an electronic gateway that provides easy, online access to your medical records. With [employer], you can request a clinic appointment, read your test results, renew a prescription or communicate with your care team.     To access your existing account, please contact your [facility] or call [phone] for assistance.        Care EveryWhere ID     This is your Care EveryWhere ID. This could be used by other organizations to access your [facility] medical records  [id number]        Your Vitals Were     Height BMI (Body Mass Index)                1.778 m (5' 10\") 29.41 kg/m2           Blood Pressure from Last 3 Encounters:   03/09/18 109/58   02/13/18 145/75    Weight from Last 3 Encounters:   06/01/18 93 kg (205 lb)   04/20/18 93.4 kg (205 lb 12.8 oz)   03/07/18 93.4 kg (205 lb 14.6 oz)              Today, you had the following     No orders found for " display       Primary Care Provider Fax #    [facility] [phone]       [facility] [address]        Equal Access to Services     PEYTON VILLASENOR : Vladimir Christianson, byron petit, tena menesesmairina adame, celine jackson earleve felizbillkim medranoAbelino [facility] [phone].    ATENCIÓN: Si habla español, tiene a ma disposición servicios gratuitos de asistencia lingüística. Janusz al [phone].    We comply with applicable federal civil rights laws and [place] laws. We do not discriminate on the basis of race, color, national origin, age, disability, sex, sexual orientation, or gender identity.            Thank you!     Thank you for choosing [facility]  for your care. Our goal is always to provide you with excellent care. Hearing back from our patients is one way we can continue to improve our services. Please take a few minutes to complete the written survey that you may receive in the mail after your visit with us. Thank you!             Your Updated Medication List - Protect others around you: Learn how to safely use, store and throw away your medicines at www.disposemymeds.org.          This list is accurate as of 6/1/18 11:29 AM.  Always use your most recent med list.                   Brand Name Dispense Instructions for use Diagnosis    acetaminophen 325 MG tablet    TYLENOL    100 tablet    Take 2 tablets (650 mg) by mouth every 6 hours as needed for mild pain    Status post lumbar spine surgery for decompression of spinal cord       ASPIRIN PO      Take 325 mg by mouth At Bedtime        ATENOLOL PO      Take 12.5 mg by mouth every morning        ATORVASTATIN CALCIUM PO      Take 40 mg by mouth At Bedtime        Fish Oil 1000 MG Cpdr      Take 1,000 mg by mouth 2 times daily        FLUTICASONE FUROATE NA      [place] in nostril 2 times daily        HYDROCHLOROTHIAZIDE PO      Take 25 mg by mouth every morning        LISINOPRIL PO      Take  20 mg by mouth every morning        ranitidine 150 MG tablet    ZANTAC    60 tablet    Take 1 tablet (150 mg) by mouth daily

## 2018-06-01 NOTE — NURSING NOTE
"Reason For Visit:   Chief Complaint   Patient presents with     Surgical Followup     pt states he has no back pain today he does have right knee pain.       Primary MD: [facility]  Ref. MD:     Primary MD: [facility]  Ref. MD:   ?  No  Occupation :None.  Currently working? No.  Work status?  Retired.  Smoker: No  Request smoking cessation information: No     Date of surgery: 03/07/2018  Type of surgery: 1. L3, L4 and L5 laminectomy and partial medial facetectomies and foraminotomies for decompression of the L3, L4 and L5 nerve roots..    Ht 1.778 m (5' 10\")  Wt 93 kg (205 lb)  BMI 29.41 kg/m2    Pain Assessment  Patient Currently in Pain: No    Oswestry (CHAS) Questionnaire    OSWESTRY DISABILITY INDEX 5/25/2018   Count 10   Sum 0   Oswestry Score (%) 0          Visual Analog Pain Scale  Back Pain Scale 0-10: 0  Right leg pain:  (Right knee pain)  Left leg pain: 0    Promis 10 Assessment    PROMIS 10 5/25/2018   In general, would you say your health is: Good   In general, would you say your quality of life is: Very good   In general, how would you rate your physical health? Good   In general, how would you rate your mental health, including your mood and your ability to think? Very good   In general, how would you rate your satisfaction with your social activities and relationships? Very good   In general, please rate how well you carry out your usual social activities and roles Very good   To what extent are you able to carry out your everyday physical activities such as walking, climbing stairs, carrying groceries, or moving a chair? Completely   How often have you been bothered by emotional problems such as feeling anxious, depressed or irritable? Never   How would you rate your fatigue on average? Mild   How would you rate your pain on average?   0 = No Pain  to  10 = Worst Imaginable Pain 1   In general, would you say your health is: 3 "   In general, would you say your quality of life is: 4   In general, how would you rate your physical health? 3   In general, how would you rate your mental health, including your mood and your ability to think? 4   In general, how would you rate your satisfaction with your social activities and relationships? 4   In general, please rate how well you carry out your usual social activities and roles. (This includes activities at home, at work and in your community, and responsibilities as a parent, child, spouse, employee, friend, etc.) 4   To what extent are you able to carry out your everyday physical activities such as walking, climbing stairs, carrying groceries, or moving a chair? 5   In the past 7 days, how often have you been bothered by emotional problems such as feeling anxious, depressed, or irritable? 1   In the past 7 days, how would you rate your fatigue on average? 2   In the past 7 days, how would you rate your pain on average, where 0 means no pain, and 10 means worst imaginable pain? 1   Global Mental Health Score 17   Global Physical Health Score 16   PROMIS TOTAL - SUBSCORES 33   Some recent data might be hidden                Teo Mederos CMA
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
## 2020-03-02 ENCOUNTER — HEALTH MAINTENANCE LETTER (OUTPATIENT)
Age: 75
End: 2020-03-02

## 2020-12-14 ENCOUNTER — HEALTH MAINTENANCE LETTER (OUTPATIENT)
Age: 75
End: 2020-12-14

## 2021-04-18 ENCOUNTER — HEALTH MAINTENANCE LETTER (OUTPATIENT)
Age: 76
End: 2021-04-18

## 2021-10-02 ENCOUNTER — HEALTH MAINTENANCE LETTER (OUTPATIENT)
Age: 76
End: 2021-10-02

## 2022-05-14 ENCOUNTER — HEALTH MAINTENANCE LETTER (OUTPATIENT)
Age: 77
End: 2022-05-14

## 2022-09-03 ENCOUNTER — HEALTH MAINTENANCE LETTER (OUTPATIENT)
Age: 77
End: 2022-09-03

## 2023-06-03 ENCOUNTER — HEALTH MAINTENANCE LETTER (OUTPATIENT)
Age: 78
End: 2023-06-03

## (undated) DEVICE — DRAPE LAP W/ARMBOARD 29410

## (undated) DEVICE — SOL NACL 0.9% IRRIG 1000ML BOTTLE 2F7124

## (undated) DEVICE — DRSG KERLIX FLUFFS X5

## (undated) DEVICE — GOWN XLG DISP 9545

## (undated) DEVICE — TUBING SUCTION MEDI-VAC SOFT 3/16"X20' N520A

## (undated) DEVICE — BRUSH SURGICAL SCRUB W/4% CHLORHEXIDINE GLUCONATE SOL 4458A

## (undated) DEVICE — CATH TRAY FOLEY SURESTEP 16FR WDRAIN BAG STLK LATEX A300316A

## (undated) DEVICE — LINEN BACK PACK 5440

## (undated) DEVICE — SPONGE SURGIFOAM 12 1972

## (undated) DEVICE — MIDAS REX DIFFUSER LUBRICANT LEGEND PA100-A

## (undated) DEVICE — ESU ELEC BLADE 2.75" COATED/INSULATED E1455

## (undated) DEVICE — RX SURGIFLO HEMOSTATIC MATRIX W/THROMBIN 8ML NEXTGEN 2993

## (undated) DEVICE — ESU GROUND PAD UNIVERSAL W/O CORD

## (undated) DEVICE — SU DERMABOND ADVANCED .7ML DNX12

## (undated) DEVICE — DRSG DRAIN 4X4" 7086

## (undated) DEVICE — GLOVE PROTEXIS BLUE W/NEU-THERA 8.0  2D73EB80

## (undated) DEVICE — SPONGE COTTONOID 1/2X1/2" 80-1400

## (undated) DEVICE — SU MONOCRYL 3-0 PS-2 18" UND Y497G

## (undated) DEVICE — SU VICRYL 0 CT-1 CR 8X18" J740D

## (undated) DEVICE — BASIN SET MAJOR

## (undated) DEVICE — BONE WAX 2.5GM W31G

## (undated) DEVICE — PEN MARKING SKIN W/LABELS 31145918

## (undated) DEVICE — Device

## (undated) DEVICE — DRAPE MAYO STAND 23X54 8337

## (undated) DEVICE — SU ETHILON 3-0 PS-1 18" 1663H

## (undated) DEVICE — PREP CHLORAPREP 26ML TINTED ORANGE  260815

## (undated) DEVICE — SYR 50ML LL W/O NDL 309653

## (undated) DEVICE — GLOVE PROTEXIS W/NEU-THERA 7.5  2D73TE75

## (undated) DEVICE — DRSG AQUACEL AG 3.5X6.0" HYDROFIBER 412010

## (undated) DEVICE — MIDAS REX DISSECTING TOOL 4MM BALL 140MM 14BA40

## (undated) DEVICE — NDL SPINAL 18GA 3.5" 405184

## (undated) DEVICE — PAD ARMBOARD FOAM EGGCRATE COVIDEN 3114367

## (undated) DEVICE — ESU CORD BIPOLAR GREEN 10-4000

## (undated) DEVICE — SOL ISOPROPYL RUBBING ALCOHOL USP 70% 4OZ HDX-20 I0020

## (undated) DEVICE — SOL WATER IRRIG 1000ML BOTTLE 2F7114

## (undated) DEVICE — DRAIN JACKSON PRATT RESERVOIR 100ML SU130-1305

## (undated) DEVICE — LINEN GOWN X4 5410

## (undated) DEVICE — SUCTION MANIFOLD DORNOCH ULTRA CART UL-CL500

## (undated) DEVICE — SPONGE KITTNER 31001010

## (undated) DEVICE — DRAIN JACKSON PRATT ROUND W/TROCAR 15FR LF JP-HUR151

## (undated) DEVICE — GOWN IMPERVIOUS SPECIALTY XLG/XLONG 32474

## (undated) DEVICE — DRSG TEGADERM 4X4 3/4" 1626W

## (undated) DEVICE — SU MONOCRYL 2-0 SH 27" UND Y417H

## (undated) RX ORDER — SODIUM CHLORIDE, SODIUM LACTATE, POTASSIUM CHLORIDE, CALCIUM CHLORIDE 600; 310; 30; 20 MG/100ML; MG/100ML; MG/100ML; MG/100ML
INJECTION, SOLUTION INTRAVENOUS
Status: DISPENSED
Start: 2018-03-07

## (undated) RX ORDER — ONDANSETRON 2 MG/ML
INJECTION INTRAMUSCULAR; INTRAVENOUS
Status: DISPENSED
Start: 2018-03-07

## (undated) RX ORDER — FENTANYL CITRATE 50 UG/ML
INJECTION, SOLUTION INTRAMUSCULAR; INTRAVENOUS
Status: DISPENSED
Start: 2018-03-07

## (undated) RX ORDER — PHENYLEPHRINE HCL IN 0.9% NACL 1 MG/10 ML
SYRINGE (ML) INTRAVENOUS
Status: DISPENSED
Start: 2018-03-07

## (undated) RX ORDER — OXYCODONE HYDROCHLORIDE 5 MG/1
TABLET ORAL
Status: DISPENSED
Start: 2018-03-07

## (undated) RX ORDER — DEXAMETHASONE SODIUM PHOSPHATE 4 MG/ML
INJECTION, SOLUTION INTRA-ARTICULAR; INTRALESIONAL; INTRAMUSCULAR; INTRAVENOUS; SOFT TISSUE
Status: DISPENSED
Start: 2018-03-07

## (undated) RX ORDER — CEFAZOLIN SODIUM 2 G/100ML
INJECTION, SOLUTION INTRAVENOUS
Status: DISPENSED
Start: 2018-03-07

## (undated) RX ORDER — LIDOCAINE HYDROCHLORIDE 20 MG/ML
INJECTION, SOLUTION EPIDURAL; INFILTRATION; INTRACAUDAL; PERINEURAL
Status: DISPENSED
Start: 2018-03-07

## (undated) RX ORDER — PROPOFOL 10 MG/ML
INJECTION, EMULSION INTRAVENOUS
Status: DISPENSED
Start: 2018-03-07

## (undated) RX ORDER — HYDROMORPHONE HYDROCHLORIDE 1 MG/ML
INJECTION, SOLUTION INTRAMUSCULAR; INTRAVENOUS; SUBCUTANEOUS
Status: DISPENSED
Start: 2018-03-07

## (undated) RX ORDER — EPHEDRINE SULFATE 50 MG/ML
INJECTION, SOLUTION INTRAMUSCULAR; INTRAVENOUS; SUBCUTANEOUS
Status: DISPENSED
Start: 2018-03-07